# Patient Record
Sex: FEMALE | Race: BLACK OR AFRICAN AMERICAN | NOT HISPANIC OR LATINO
[De-identification: names, ages, dates, MRNs, and addresses within clinical notes are randomized per-mention and may not be internally consistent; named-entity substitution may affect disease eponyms.]

---

## 2017-01-03 ENCOUNTER — FORM ENCOUNTER (OUTPATIENT)
Age: 42
End: 2017-01-03

## 2017-01-04 ENCOUNTER — APPOINTMENT (OUTPATIENT)
Dept: MAMMOGRAPHY | Facility: IMAGING CENTER | Age: 42
End: 2017-01-04

## 2017-01-04 ENCOUNTER — OUTPATIENT (OUTPATIENT)
Dept: OUTPATIENT SERVICES | Facility: HOSPITAL | Age: 42
LOS: 1 days | End: 2017-01-04
Payer: COMMERCIAL

## 2017-01-04 DIAGNOSIS — Z00.8 ENCOUNTER FOR OTHER GENERAL EXAMINATION: ICD-10-CM

## 2017-01-04 PROCEDURE — 77063 BREAST TOMOSYNTHESIS BI: CPT

## 2017-01-04 PROCEDURE — 77067 SCR MAMMO BI INCL CAD: CPT

## 2017-04-24 ENCOUNTER — APPOINTMENT (OUTPATIENT)
Dept: INTERNAL MEDICINE | Facility: CLINIC | Age: 42
End: 2017-04-24

## 2017-06-09 ENCOUNTER — APPOINTMENT (OUTPATIENT)
Dept: INTERNAL MEDICINE | Facility: CLINIC | Age: 42
End: 2017-06-09

## 2017-06-09 VITALS — DIASTOLIC BLOOD PRESSURE: 90 MMHG | SYSTOLIC BLOOD PRESSURE: 140 MMHG

## 2017-06-09 VITALS
WEIGHT: 142 LBS | BODY MASS INDEX: 29.81 KG/M2 | DIASTOLIC BLOOD PRESSURE: 84 MMHG | SYSTOLIC BLOOD PRESSURE: 122 MMHG | HEIGHT: 58 IN

## 2018-02-27 ENCOUNTER — APPOINTMENT (OUTPATIENT)
Dept: OBGYN | Facility: CLINIC | Age: 43
End: 2018-02-27
Payer: COMMERCIAL

## 2018-02-27 VITALS
DIASTOLIC BLOOD PRESSURE: 91 MMHG | BODY MASS INDEX: 29.47 KG/M2 | WEIGHT: 140.38 LBS | HEIGHT: 58 IN | SYSTOLIC BLOOD PRESSURE: 136 MMHG

## 2018-02-27 LAB
BILIRUB UR QL STRIP: NORMAL
GLUCOSE UR-MCNC: NORMAL
HCG UR QL: 0.2 EU/DL
HGB UR QL STRIP.AUTO: NORMAL
KETONES UR-MCNC: NORMAL
LEUKOCYTE ESTERASE UR QL STRIP: NORMAL
NITRITE UR QL STRIP: NORMAL
PH UR STRIP: 5.5
PROT UR STRIP-MCNC: NORMAL
SP GR UR STRIP: 1.02

## 2018-02-27 PROCEDURE — 81002 URINALYSIS NONAUTO W/O SCOPE: CPT

## 2018-02-27 PROCEDURE — 99396 PREV VISIT EST AGE 40-64: CPT

## 2018-03-01 LAB — BACTERIA UR CULT: NORMAL

## 2018-03-11 ENCOUNTER — EMERGENCY (EMERGENCY)
Facility: HOSPITAL | Age: 43
LOS: 1 days | Discharge: HOME | End: 2018-03-11
Attending: EMERGENCY MEDICINE | Admitting: EMERGENCY MEDICINE
Payer: COMMERCIAL

## 2018-03-11 VITALS
HEART RATE: 86 BPM | OXYGEN SATURATION: 100 % | RESPIRATION RATE: 16 BRPM | TEMPERATURE: 99 F | DIASTOLIC BLOOD PRESSURE: 96 MMHG | SYSTOLIC BLOOD PRESSURE: 149 MMHG

## 2018-03-11 LAB
ALBUMIN SERPL ELPH-MCNC: 4.3 G/DL — SIGNIFICANT CHANGE UP (ref 3.3–5)
ALP SERPL-CCNC: 57 U/L — SIGNIFICANT CHANGE UP (ref 40–120)
ALT FLD-CCNC: 32 U/L — SIGNIFICANT CHANGE UP (ref 4–33)
APPEARANCE UR: CLEAR — SIGNIFICANT CHANGE UP
AST SERPL-CCNC: 24 U/L — SIGNIFICANT CHANGE UP (ref 4–32)
BACTERIA # UR AUTO: SIGNIFICANT CHANGE UP
BASE EXCESS BLDV CALC-SCNC: 4.4 MMOL/L — SIGNIFICANT CHANGE UP
BASOPHILS # BLD AUTO: 0.02 K/UL — SIGNIFICANT CHANGE UP (ref 0–0.2)
BASOPHILS NFR BLD AUTO: 0.3 % — SIGNIFICANT CHANGE UP (ref 0–2)
BILIRUB SERPL-MCNC: 0.2 MG/DL — SIGNIFICANT CHANGE UP (ref 0.2–1.2)
BILIRUB UR-MCNC: NEGATIVE — SIGNIFICANT CHANGE UP
BLOOD GAS VENOUS - CREATININE: 0.76 MG/DL — SIGNIFICANT CHANGE UP (ref 0.5–1.3)
BLOOD UR QL VISUAL: NEGATIVE — SIGNIFICANT CHANGE UP
BUN SERPL-MCNC: 13 MG/DL — SIGNIFICANT CHANGE UP (ref 7–23)
CALCIUM SERPL-MCNC: 8.5 MG/DL — SIGNIFICANT CHANGE UP (ref 8.4–10.5)
CHLORIDE BLDV-SCNC: 106 MMOL/L — SIGNIFICANT CHANGE UP (ref 96–108)
CHLORIDE SERPL-SCNC: 102 MMOL/L — SIGNIFICANT CHANGE UP (ref 98–107)
CO2 SERPL-SCNC: 25 MMOL/L — SIGNIFICANT CHANGE UP (ref 22–31)
COLOR SPEC: SIGNIFICANT CHANGE UP
CREAT SERPL-MCNC: 0.8 MG/DL — SIGNIFICANT CHANGE UP (ref 0.5–1.3)
EOSINOPHIL # BLD AUTO: 0.09 K/UL — SIGNIFICANT CHANGE UP (ref 0–0.5)
EOSINOPHIL NFR BLD AUTO: 1.4 % — SIGNIFICANT CHANGE UP (ref 0–6)
GAS PNL BLDV: 136 MMOL/L — SIGNIFICANT CHANGE UP (ref 136–146)
GLUCOSE BLDV-MCNC: 82 — SIGNIFICANT CHANGE UP (ref 70–99)
GLUCOSE SERPL-MCNC: 79 MG/DL — SIGNIFICANT CHANGE UP (ref 70–99)
GLUCOSE UR-MCNC: NEGATIVE — SIGNIFICANT CHANGE UP
HCG SERPL-ACNC: < 5 MIU/ML — SIGNIFICANT CHANGE UP
HCO3 BLDV-SCNC: 26 MMOL/L — SIGNIFICANT CHANGE UP (ref 20–27)
HCT VFR BLD CALC: 39.2 % — SIGNIFICANT CHANGE UP (ref 34.5–45)
HCT VFR BLDV CALC: 39.1 % — SIGNIFICANT CHANGE UP (ref 34.5–45)
HGB BLD-MCNC: 12.4 G/DL — SIGNIFICANT CHANGE UP (ref 11.5–15.5)
HGB BLDV-MCNC: 12.7 G/DL — SIGNIFICANT CHANGE UP (ref 11.5–15.5)
IMM GRANULOCYTES # BLD AUTO: 0.02 # — SIGNIFICANT CHANGE UP
IMM GRANULOCYTES NFR BLD AUTO: 0.3 % — SIGNIFICANT CHANGE UP (ref 0–1.5)
KETONES UR-MCNC: NEGATIVE — SIGNIFICANT CHANGE UP
LACTATE BLDV-MCNC: 0.5 MMOL/L — SIGNIFICANT CHANGE UP (ref 0.5–2)
LEUKOCYTE ESTERASE UR-ACNC: NEGATIVE — SIGNIFICANT CHANGE UP
LYMPHOCYTES # BLD AUTO: 2.41 K/UL — SIGNIFICANT CHANGE UP (ref 1–3.3)
LYMPHOCYTES # BLD AUTO: 37.8 % — SIGNIFICANT CHANGE UP (ref 13–44)
MCHC RBC-ENTMCNC: 27.2 PG — SIGNIFICANT CHANGE UP (ref 27–34)
MCHC RBC-ENTMCNC: 31.6 % — LOW (ref 32–36)
MCV RBC AUTO: 86 FL — SIGNIFICANT CHANGE UP (ref 80–100)
MONOCYTES # BLD AUTO: 0.75 K/UL — SIGNIFICANT CHANGE UP (ref 0–0.9)
MONOCYTES NFR BLD AUTO: 11.8 % — SIGNIFICANT CHANGE UP (ref 2–14)
NEUTROPHILS # BLD AUTO: 3.09 K/UL — SIGNIFICANT CHANGE UP (ref 1.8–7.4)
NEUTROPHILS NFR BLD AUTO: 48.4 % — SIGNIFICANT CHANGE UP (ref 43–77)
NITRITE UR-MCNC: NEGATIVE — SIGNIFICANT CHANGE UP
NRBC # FLD: 0 — SIGNIFICANT CHANGE UP
PCO2 BLDV: 54 MMHG — HIGH (ref 41–51)
PH BLDV: 7.36 PH — SIGNIFICANT CHANGE UP (ref 7.32–7.43)
PH UR: 6.5 — SIGNIFICANT CHANGE UP (ref 4.6–8)
PLATELET # BLD AUTO: 354 K/UL — SIGNIFICANT CHANGE UP (ref 150–400)
PMV BLD: 9.5 FL — SIGNIFICANT CHANGE UP (ref 7–13)
PO2 BLDV: 24 MMHG — LOW (ref 35–40)
POTASSIUM BLDV-SCNC: 3.9 MMOL/L — SIGNIFICANT CHANGE UP (ref 3.4–4.5)
POTASSIUM SERPL-MCNC: 4.1 MMOL/L — SIGNIFICANT CHANGE UP (ref 3.5–5.3)
POTASSIUM SERPL-SCNC: 4.1 MMOL/L — SIGNIFICANT CHANGE UP (ref 3.5–5.3)
PROT SERPL-MCNC: 7.6 G/DL — SIGNIFICANT CHANGE UP (ref 6–8.3)
PROT UR-MCNC: 10 MG/DL — SIGNIFICANT CHANGE UP
RBC # BLD: 4.56 M/UL — SIGNIFICANT CHANGE UP (ref 3.8–5.2)
RBC # FLD: 13.5 % — SIGNIFICANT CHANGE UP (ref 10.3–14.5)
RBC CASTS # UR COMP ASSIST: SIGNIFICANT CHANGE UP
SAO2 % BLDV: 23.4 % — LOW (ref 60–85)
SODIUM SERPL-SCNC: 141 MMOL/L — SIGNIFICANT CHANGE UP (ref 135–145)
SP GR SPEC: 1.02 — SIGNIFICANT CHANGE UP (ref 1–1.04)
SQUAMOUS # UR AUTO: SIGNIFICANT CHANGE UP
UROBILINOGEN FLD QL: NORMAL MG/DL — SIGNIFICANT CHANGE UP
WBC # BLD: 6.38 K/UL — SIGNIFICANT CHANGE UP (ref 3.8–10.5)
WBC # FLD AUTO: 6.38 K/UL — SIGNIFICANT CHANGE UP (ref 3.8–10.5)

## 2018-03-11 PROCEDURE — 99284 EMERGENCY DEPT VISIT MOD MDM: CPT

## 2018-03-11 PROCEDURE — 74176 CT ABD & PELVIS W/O CONTRAST: CPT | Mod: 26

## 2018-03-11 RX ORDER — ACETAMINOPHEN 500 MG
1000 TABLET ORAL ONCE
Qty: 0 | Refills: 0 | Status: COMPLETED | OUTPATIENT
Start: 2018-03-11 | End: 2018-03-11

## 2018-03-11 RX ADMIN — Medication 400 MILLIGRAM(S): at 20:43

## 2018-03-11 NOTE — ED PROVIDER NOTE - PROGRESS NOTE DETAILS
Guevara PGY1: Pelvic exam performed- Jasmin Molina, ED tech chaperoned- no adnexal or CMT tenderness on bimanual exam Raven att: CT neg appy, ua neg pyelo. Labs wnl. Patient pain free. D/w patient ct indicates lt adnexal mass and recommends TVUS to further evaluate. Patient reports she can arrange an appointment with her OBGYN later this week.

## 2018-03-11 NOTE — ED PROVIDER NOTE - OBJECTIVE STATEMENT
42F hx of partial hysterectomy due to fibroids c/o of RLQ abdominal pain described as 8/10 dull sensation, worsened with sitting forward, with right flank radiation, with worsening on movement, constant pain x 3-7 days, with associated nausea x 1 but no emesis yesterday. Normal BMs, no diarrhea. constipation. No fevers/ chills/ dysuria/ hematuria. Had recent GYN appt 1 week ago with normal pelvic exam. Not currently sexually active, not endorsing vaginal bleed/discharge.

## 2018-03-11 NOTE — ED PROVIDER NOTE - ATTENDING CONTRIBUTION TO CARE
thelma: noted mild intermittent pain to the rt mid/upper adbomen approx one week ago. No other sx.   4 days ago, pain increased, became more constant and radiated to back. No other associated GI or  sx. Appetite is good. No prior hx same sx.  PMH includes partial hysterectomy for fibroids; ovaries not removed.   exam: remarkable for rt CVA tenderness (pain even on touching area, as well as palpation).  abd soft and nontender. Liver and spleen not palpated.   When asked when she feels the pain, pt points to area above [place] and just below ribs.   Etio sx??. Need to consider an atypical presentation of GB or renal colic.   Recc:  CT w/o contrast to first start evaluation for renal colic. CBB, CMP, ua, fluids. thelma: noted mild intermittent pain to the rt mid/upper adbomen approx one week ago. No other sx.   4 days ago, pain increased, became more constant and radiated to back. No other associated GI or  sx. Appetite is good. No prior hx same sx.  PMH includes partial hysterectomy for fibroids; ovaries not removed.   exam: remarkable for rt CVA tenderness (pain even on touching area, as well as palpation).  abd soft and nontender. Liver and spleen not palpated.   When asked when she feels the pain, pt points to area above [place] and just below ribs.   Etio sx??. Need to consider an atypical presentation of GB or renal colic.   Recc:  CT w/o contrast to first start evaluation for renal colic. CBB, CMP, ua, fluids..

## 2018-03-11 NOTE — ED PROVIDER NOTE - MEDICAL DECISION MAKING DETAILS
42F with hx partial hysterectomy with RLQ dull sensation- possible UTI vs cystitis vs pyelo vs renal stone, vs constipation, less likely colitis or appendicitis, will conduct bimanual to assess for adnexal tenderness and possible ovarian cause of pain, CT a/p if prelim labs nondiagnostic

## 2018-03-11 NOTE — ED ADULT TRIAGE NOTE - CHIEF COMPLAINT QUOTE
rlq pains,nausea yesterday,today. denies problems urinating. denies vomiting.  denies vag bleeding  lmp 2013- partial hysterectomy,fibroids

## 2018-03-11 NOTE — ED PROVIDER NOTE - SHIFT CHANGE DETAILS
Raven att: 42F h/o partial hysterectomy p/w 7 days intermitt rt mid abd pain, occ rad to back. Denies f, c, n, v, anorexia. Pos rt cva tenderness DDX pyelo, urolith PLAN labs, urine, ct [place], reassess.

## 2018-03-11 NOTE — ED ADULT NURSE NOTE - OBJECTIVE STATEMENT
Pt received in spot 4. Alert and oriented x3, ambulatory. Co rlq pain since yesterday. Denies n/v/d, dizziness, fevers, chills. Labs sent. IV placed. Refusing pain meds at this time. Will continue to monitor.

## 2018-03-13 LAB — SPECIMEN SOURCE: SIGNIFICANT CHANGE UP

## 2018-03-14 ENCOUNTER — OTHER (OUTPATIENT)
Age: 43
End: 2018-03-14

## 2018-03-14 ENCOUNTER — FORM ENCOUNTER (OUTPATIENT)
Age: 43
End: 2018-03-14

## 2018-03-14 LAB
-  AMIKACIN: SIGNIFICANT CHANGE UP
-  AMPICILLIN/SULBACTAM: SIGNIFICANT CHANGE UP
-  AMPICILLIN: SIGNIFICANT CHANGE UP
-  AZTREONAM: SIGNIFICANT CHANGE UP
-  CEFAZOLIN: SIGNIFICANT CHANGE UP
-  CEFEPIME: SIGNIFICANT CHANGE UP
-  CEFOXITIN: SIGNIFICANT CHANGE UP
-  CEFTAZIDIME: SIGNIFICANT CHANGE UP
-  CEFTRIAXONE: SIGNIFICANT CHANGE UP
-  CIPROFLOXACIN: SIGNIFICANT CHANGE UP
-  ERTAPENEM: SIGNIFICANT CHANGE UP
-  GENTAMICIN: SIGNIFICANT CHANGE UP
-  IMIPENEM: SIGNIFICANT CHANGE UP
-  LEVOFLOXACIN: SIGNIFICANT CHANGE UP
-  MEROPENEM: SIGNIFICANT CHANGE UP
-  NITROFURANTOIN: SIGNIFICANT CHANGE UP
-  PIPERACILLIN/TAZOBACTAM: SIGNIFICANT CHANGE UP
-  TIGECYCLINE: SIGNIFICANT CHANGE UP
-  TOBRAMYCIN: SIGNIFICANT CHANGE UP
-  TRIMETHOPRIM/SULFAMETHOXAZOLE: SIGNIFICANT CHANGE UP
BACTERIA UR CULT: SIGNIFICANT CHANGE UP
METHOD TYPE: SIGNIFICANT CHANGE UP
ORGANISM # SPEC MICROSCOPIC CNT: SIGNIFICANT CHANGE UP
ORGANISM # SPEC MICROSCOPIC CNT: SIGNIFICANT CHANGE UP

## 2018-03-14 RX ORDER — CEPHALEXIN 500 MG
1 CAPSULE ORAL
Qty: 14 | Refills: 0 | OUTPATIENT
Start: 2018-03-14 | End: 2018-03-20

## 2018-03-14 NOTE — ED POST DISCHARGE NOTE - RESULT SUMMARY
UCX: Gram negative elisa > 100,000 prelim. Admin Team to follow results to final. No antibiotic listed in ED provider note or prescription writer at time of discharge. Patient contact # [phone] no alt # listed Msg left with Admin # and hrs.

## 2018-03-15 ENCOUNTER — OUTPATIENT (OUTPATIENT)
Dept: OUTPATIENT SERVICES | Facility: HOSPITAL | Age: 43
LOS: 1 days | End: 2018-03-15
Payer: COMMERCIAL

## 2018-03-15 ENCOUNTER — APPOINTMENT (OUTPATIENT)
Dept: ULTRASOUND IMAGING | Facility: CLINIC | Age: 43
End: 2018-03-15
Payer: COMMERCIAL

## 2018-03-15 DIAGNOSIS — N83.9 NONINFLAMMATORY DISORDER OF OVARY, FALLOPIAN TUBE AND BROAD LIGAMENT, UNSPECIFIED: ICD-10-CM

## 2018-03-15 PROCEDURE — 76856 US EXAM PELVIC COMPLETE: CPT

## 2018-03-15 PROCEDURE — 76830 TRANSVAGINAL US NON-OB: CPT

## 2018-03-15 PROCEDURE — 76856 US EXAM PELVIC COMPLETE: CPT | Mod: 26

## 2018-03-15 PROCEDURE — 76830 TRANSVAGINAL US NON-OB: CPT | Mod: 26

## 2018-08-09 ENCOUNTER — MESSAGE (OUTPATIENT)
Age: 43
End: 2018-08-09

## 2018-12-13 ENCOUNTER — APPOINTMENT (OUTPATIENT)
Dept: INTERNAL MEDICINE | Facility: CLINIC | Age: 43
End: 2018-12-13

## 2019-01-26 RX ORDER — NEOMYCIN SULFATE, POLYMYXIN B SULFATE AND HYDROCORTISONE 3.5; 10000; 1 MG/ML; [IU]/ML; MG/ML
3.5-10000-1 SOLUTION AURICULAR (OTIC)
Qty: 1 | Refills: 0 | Status: COMPLETED | COMMUNITY
Start: 2019-01-26 | End: 2019-01-31

## 2019-02-06 ENCOUNTER — APPOINTMENT (OUTPATIENT)
Dept: INTERNAL MEDICINE | Facility: CLINIC | Age: 44
End: 2019-02-06

## 2019-02-15 ENCOUNTER — APPOINTMENT (OUTPATIENT)
Dept: INTERNAL MEDICINE | Facility: CLINIC | Age: 44
End: 2019-02-15

## 2019-02-26 ENCOUNTER — APPOINTMENT (OUTPATIENT)
Dept: INTERNAL MEDICINE | Facility: CLINIC | Age: 44
End: 2019-02-26
Payer: COMMERCIAL

## 2019-02-26 VITALS
BODY MASS INDEX: 28.97 KG/M2 | WEIGHT: 138 LBS | OXYGEN SATURATION: 98 % | HEART RATE: 79 BPM | DIASTOLIC BLOOD PRESSURE: 90 MMHG | HEIGHT: 58 IN | SYSTOLIC BLOOD PRESSURE: 132 MMHG

## 2019-02-26 DIAGNOSIS — L72.3 SEBACEOUS CYST: ICD-10-CM

## 2019-02-26 DIAGNOSIS — H66.90 OTITIS MEDIA, UNSPECIFIED, UNSPECIFIED EAR: ICD-10-CM

## 2019-02-26 DIAGNOSIS — H60.90 UNSPECIFIED OTITIS EXTERNA, UNSPECIFIED EAR: ICD-10-CM

## 2019-02-26 PROCEDURE — 99214 OFFICE O/P EST MOD 30 MIN: CPT

## 2019-02-26 NOTE — PHYSICAL EXAM
[No Acute Distress] : no acute distress [Well Nourished] : well nourished [Well Developed] : well developed [Well-Appearing] : well-appearing [Normal Outer Ear/Nose] : the outer ears and nose were normal in appearance [Normal Oropharynx] : the oropharynx was normal [Supple] : supple [No Lymphadenopathy] : no lymphadenopathy [No Respiratory Distress] : no respiratory distress  [Clear to Auscultation] : lungs were clear to auscultation bilaterally [Normal Rate] : normal rate  [Regular Rhythm] : with a regular rhythm [Normal S1, S2] : normal S1 and S2 [Soft] : abdomen soft [Non Tender] : non-tender [de-identified] : 5-6 cm irregular, firm cyst on right scalp [de-identified] : right TM dull and retracted without erythema

## 2019-02-26 NOTE — HISTORY OF PRESENT ILLNESS
[FreeTextEntry8] : Pt returns to office to f/u on right ear discharge.  Was treated by on-call doc with ear drops for a discharge at the end of January.  She wound up going to urgent care and was put on amoxicillin for 10 days and a spray to help with the congestion.  She finished the course of antibiotics and her symptoms are much better.  No c/o pain at this time, more a fullness.  \par \par No discharge from her ear at this time.  Had a fever to 101-102.   Was told that she should see an ENT.\par \par Also c/o a lump on her scalp.

## 2019-02-26 NOTE — ASSESSMENT
[FreeTextEntry1] : 1.  Otitis media/externa - resolved although TM is dull and retracted on the right.  ENT referral given\par 2.  Scalp lesion c/w sebaceous cyst.  Plastics referral given\par 3.  Work paperwork completed for today's visit and to excuse her from work tonight given that she is here today and still has not slept.\par 4.  RTO for a CPE.

## 2019-03-14 ENCOUNTER — APPOINTMENT (OUTPATIENT)
Dept: INTERNAL MEDICINE | Facility: CLINIC | Age: 44
End: 2019-03-14
Payer: COMMERCIAL

## 2019-03-14 VITALS
DIASTOLIC BLOOD PRESSURE: 70 MMHG | BODY MASS INDEX: 27.59 KG/M2 | WEIGHT: 132 LBS | HEART RATE: 77 BPM | SYSTOLIC BLOOD PRESSURE: 114 MMHG | OXYGEN SATURATION: 98 %

## 2019-03-14 DIAGNOSIS — H69.82 OTHER SPECIFIED DISORDERS OF EUSTACHIAN TUBE, LEFT EAR: ICD-10-CM

## 2019-03-14 PROCEDURE — 99213 OFFICE O/P EST LOW 20 MIN: CPT

## 2019-03-14 NOTE — HISTORY OF PRESENT ILLNESS
[Earache (L)] : pain in left ear [___ Weeks ago] :  [unfilled] weeks ago [Constant] : constant [FreeTextEntry8] : pt continues to have left ear pain. last night she only got 1 hour of sleep and had to call out from work as an [employer]. She asks for work excuse letter today.

## 2019-03-14 NOTE — PHYSICAL EXAM
[No Acute Distress] : no acute distress [Normal Sclera/Conjunctiva] : normal sclera/conjunctiva [Normal Oropharynx] : the oropharynx was normal [Normal TMs] : both tympanic membranes were normal [Supple] : supple

## 2019-03-25 ENCOUNTER — APPOINTMENT (OUTPATIENT)
Dept: PLASTIC SURGERY | Facility: CLINIC | Age: 44
End: 2019-03-25
Payer: COMMERCIAL

## 2019-03-25 VITALS — TEMPERATURE: 98.3 F | BODY MASS INDEX: 30.29 KG/M2 | HEIGHT: 67 IN | WEIGHT: 193 LBS

## 2019-03-25 VITALS — BODY MASS INDEX: 27.42 KG/M2 | HEIGHT: 59 IN | TEMPERATURE: 97.7 F | WEIGHT: 136 LBS

## 2019-03-25 PROCEDURE — 99203 OFFICE O/P NEW LOW 30 MIN: CPT

## 2019-03-25 NOTE — PHYSICAL EXAM
[de-identified] : there is a firm scalp neoplasm (approx 1.5 to 2 cm) of the right parietal scalp; there is alopecia at the apex of the neoplasm; there is no drainage w/ palpation; there is no adjacent scars, erythema, or open wounds

## 2019-03-25 NOTE — HISTORY OF PRESENT ILLNESS
[FreeTextEntry1] : 45 yo F presents with right scalp mass\par present for 2 years\par increasing in size\par intermittent drainage\par no interventions\par no hx of similar lesions elsewhere\par no imaging\par patient states that increase in size causes discomfort

## 2019-03-25 NOTE — PHYSICAL EXAM
[de-identified] : there is a firm scalp neoplasm (approx 1.5 to 2 cm) of the right parietal scalp; there is alopecia at the apex of the neoplasm; there is no drainage w/ palpation; there is no adjacent scars, erythema, or open wounds

## 2019-04-08 ENCOUNTER — MESSAGE (OUTPATIENT)
Age: 44
End: 2019-04-08

## 2019-04-11 ENCOUNTER — APPOINTMENT (OUTPATIENT)
Dept: INTERNAL MEDICINE | Facility: CLINIC | Age: 44
End: 2019-04-11

## 2019-04-17 ENCOUNTER — OUTPATIENT (OUTPATIENT)
Dept: OUTPATIENT SERVICES | Facility: HOSPITAL | Age: 44
LOS: 1 days | End: 2019-04-17
Payer: COMMERCIAL

## 2019-04-17 VITALS
HEIGHT: 59 IN | TEMPERATURE: 98 F | DIASTOLIC BLOOD PRESSURE: 70 MMHG | WEIGHT: 134.04 LBS | SYSTOLIC BLOOD PRESSURE: 158 MMHG | RESPIRATION RATE: 18 BRPM | HEART RATE: 65 BPM | OXYGEN SATURATION: 100 %

## 2019-04-17 DIAGNOSIS — Z90.710 ACQUIRED ABSENCE OF BOTH CERVIX AND UTERUS: Chronic | ICD-10-CM

## 2019-04-17 DIAGNOSIS — S91.203A UNSPECIFIED OPEN WOUND OF UNSPECIFIED GREAT TOE WITH DAMAGE TO NAIL, INITIAL ENCOUNTER: Chronic | ICD-10-CM

## 2019-04-17 DIAGNOSIS — R22.0 LOCALIZED SWELLING, MASS AND LUMP, HEAD: ICD-10-CM

## 2019-04-17 DIAGNOSIS — Z98.890 OTHER SPECIFIED POSTPROCEDURAL STATES: ICD-10-CM

## 2019-04-17 DIAGNOSIS — D48.1 NEOPLASM OF UNCERTAIN BEHAVIOR OF CONNECTIVE AND OTHER SOFT TISSUE: ICD-10-CM

## 2019-04-17 DIAGNOSIS — M95.2 OTHER ACQUIRED DEFORMITY OF HEAD: ICD-10-CM

## 2019-04-17 DIAGNOSIS — D21.0 BENIGN NEOPLASM OF CONNECTIVE AND OTHER SOFT TISSUE OF HEAD, FACE AND NECK: ICD-10-CM

## 2019-04-17 DIAGNOSIS — Z01.818 ENCOUNTER FOR OTHER PREPROCEDURAL EXAMINATION: ICD-10-CM

## 2019-04-17 DIAGNOSIS — D21.9 BENIGN NEOPLASM OF CONNECTIVE AND OTHER SOFT TISSUE, UNSPECIFIED: ICD-10-CM

## 2019-04-17 DIAGNOSIS — Z98.891 HISTORY OF UTERINE SCAR FROM PREVIOUS SURGERY: Chronic | ICD-10-CM

## 2019-04-17 LAB
HCT VFR BLD CALC: 39.3 % — SIGNIFICANT CHANGE UP (ref 34.5–45)
HGB BLD-MCNC: 12.1 G/DL — SIGNIFICANT CHANGE UP (ref 11.5–15.5)
MCHC RBC-ENTMCNC: 27.3 PG — SIGNIFICANT CHANGE UP (ref 27–34)
MCHC RBC-ENTMCNC: 30.8 GM/DL — LOW (ref 32–36)
MCV RBC AUTO: 88.5 FL — SIGNIFICANT CHANGE UP (ref 80–100)
PLATELET # BLD AUTO: 368 K/UL — SIGNIFICANT CHANGE UP (ref 150–400)
RBC # BLD: 4.44 M/UL — SIGNIFICANT CHANGE UP (ref 3.8–5.2)
RBC # FLD: 13.1 % — SIGNIFICANT CHANGE UP (ref 10.3–14.5)
WBC # BLD: 4.57 K/UL — SIGNIFICANT CHANGE UP (ref 3.8–10.5)
WBC # FLD AUTO: 4.57 K/UL — SIGNIFICANT CHANGE UP (ref 3.8–10.5)

## 2019-04-17 PROCEDURE — G0463: CPT

## 2019-04-17 PROCEDURE — 85027 COMPLETE CBC AUTOMATED: CPT

## 2019-04-17 RX ORDER — LIDOCAINE HCL 20 MG/ML
0.2 VIAL (ML) INJECTION ONCE
Qty: 0 | Refills: 0 | Status: DISCONTINUED | OUTPATIENT
Start: 2019-04-24 | End: 2019-05-09

## 2019-04-17 RX ORDER — SODIUM CHLORIDE 9 MG/ML
3 INJECTION INTRAMUSCULAR; INTRAVENOUS; SUBCUTANEOUS EVERY 8 HOURS
Qty: 0 | Refills: 0 | Status: DISCONTINUED | OUTPATIENT
Start: 2019-04-24 | End: 2019-05-09

## 2019-04-17 NOTE — H&P PST ADULT - NSICDXPASTMEDICALHX_GEN_ALL_CORE_FT
PAST MEDICAL HISTORY:  Fibroid uterus     HTN (hypertension)     Migraine     Soft tissue tumor, benign right side of scalp PAST MEDICAL HISTORY:  Environmental allergies     Fibroid uterus h/o ANGELITO 2013    HTN (hypertension) last treated 2013    Migraine     Soft tissue tumor, benign right side of scalp

## 2019-04-17 NOTE — H&P PST ADULT - HISTORY OF PRESENT ILLNESS
This is a 43 year old female with a mass on right scalp x 2 years.  Pt states it has become larger over the past 18 months and causing discomfort.  Now scheduled for excision of right scalp neoplasm on 4-24-19

## 2019-04-17 NOTE — H&P PST ADULT - NSICDXPASTSURGICALHX_GEN_ALL_CORE_FT
PAST SURGICAL HISTORY:  H/O  section     S/P ANGELITO (total abdominal hysterectomy) fibroids     S/P tooth extraction ' 2009    [place] teeth X2 PAST SURGICAL HISTORY:  H/O  section     S/P ANGELITO (total abdominal hysterectomy) fibroids     S/P tooth extraction ' 2009    [place] teeth X2    Traumatic loss of toenail of great toe excision  right side 2018

## 2019-04-17 NOTE — H&P PST ADULT - NSICDXPROBLEM_GEN_ALL_CORE_FT
PROBLEM DIAGNOSES  Problem: Soft tissue tumor, benign  Assessment and Plan: excision of right scalp neoplasm    Problem: H/O excision of testicular mass  Assessment and Plan:

## 2019-04-17 NOTE — H&P PST ADULT - NSANTHOSAYNRD_GEN_A_CORE
No. ANGÉLICA screening performed.  STOP BANG Legend: 0-2 = LOW Risk; 3-4 = INTERMEDIATE Risk; 5-8 = HIGH Risk

## 2019-04-23 RX ORDER — CELECOXIB 200 MG/1
200 CAPSULE ORAL ONCE
Qty: 0 | Refills: 0 | Status: DISCONTINUED | OUTPATIENT
Start: 2019-04-24 | End: 2019-05-09

## 2019-04-23 RX ORDER — SODIUM CHLORIDE 9 MG/ML
1000 INJECTION, SOLUTION INTRAVENOUS
Qty: 0 | Refills: 0 | Status: DISCONTINUED | OUTPATIENT
Start: 2019-04-24 | End: 2019-05-09

## 2019-04-23 RX ORDER — ONDANSETRON 8 MG/1
4 TABLET, FILM COATED ORAL ONCE
Qty: 0 | Refills: 0 | Status: DISCONTINUED | OUTPATIENT
Start: 2019-04-24 | End: 2019-05-09

## 2019-04-24 ENCOUNTER — OUTPATIENT (OUTPATIENT)
Dept: OUTPATIENT SERVICES | Facility: HOSPITAL | Age: 44
LOS: 1 days | End: 2019-04-24
Payer: COMMERCIAL

## 2019-04-24 ENCOUNTER — RESULT REVIEW (OUTPATIENT)
Age: 44
End: 2019-04-24

## 2019-04-24 VITALS
OXYGEN SATURATION: 100 % | DIASTOLIC BLOOD PRESSURE: 68 MMHG | RESPIRATION RATE: 18 BRPM | SYSTOLIC BLOOD PRESSURE: 144 MMHG | HEART RATE: 80 BPM

## 2019-04-24 VITALS
WEIGHT: 134.04 LBS | TEMPERATURE: 98 F | OXYGEN SATURATION: 100 % | HEIGHT: 59 IN | HEART RATE: 65 BPM | SYSTOLIC BLOOD PRESSURE: 158 MMHG | RESPIRATION RATE: 18 BRPM | DIASTOLIC BLOOD PRESSURE: 83 MMHG

## 2019-04-24 DIAGNOSIS — Z90.710 ACQUIRED ABSENCE OF BOTH CERVIX AND UTERUS: Chronic | ICD-10-CM

## 2019-04-24 DIAGNOSIS — R22.0 LOCALIZED SWELLING, MASS AND LUMP, HEAD: ICD-10-CM

## 2019-04-24 DIAGNOSIS — D21.0 BENIGN NEOPLASM OF CONNECTIVE AND OTHER SOFT TISSUE OF HEAD, FACE AND NECK: ICD-10-CM

## 2019-04-24 DIAGNOSIS — S91.203A UNSPECIFIED OPEN WOUND OF UNSPECIFIED GREAT TOE WITH DAMAGE TO NAIL, INITIAL ENCOUNTER: Chronic | ICD-10-CM

## 2019-04-24 DIAGNOSIS — Z01.818 ENCOUNTER FOR OTHER PREPROCEDURAL EXAMINATION: ICD-10-CM

## 2019-04-24 DIAGNOSIS — Z98.891 HISTORY OF UTERINE SCAR FROM PREVIOUS SURGERY: Chronic | ICD-10-CM

## 2019-04-24 DIAGNOSIS — D48.1 NEOPLASM OF UNCERTAIN BEHAVIOR OF CONNECTIVE AND OTHER SOFT TISSUE: ICD-10-CM

## 2019-04-24 DIAGNOSIS — M95.2 OTHER ACQUIRED DEFORMITY OF HEAD: ICD-10-CM

## 2019-04-24 PROCEDURE — 21014 EXC FACE TUM DEEP 2 CM/>: CPT

## 2019-04-24 PROCEDURE — 88305 TISSUE EXAM BY PATHOLOGIST: CPT | Mod: 26

## 2019-04-24 PROCEDURE — 14020 TIS TRNFR S/A/L 10 SQ CM/<: CPT

## 2019-04-24 PROCEDURE — 88305 TISSUE EXAM BY PATHOLOGIST: CPT

## 2019-04-24 PROCEDURE — C1889: CPT

## 2019-04-24 NOTE — ASU PATIENT PROFILE, ADULT - PSH
H/O  section    S/P ANGELITO (total abdominal hysterectomy)  fibroids   S/P tooth extraction  '     [place] teeth X2  Traumatic loss of toenail of great toe  excision  right side 2018

## 2019-04-24 NOTE — ASU DISCHARGE PLAN (ADULT/PEDIATRIC) - ASU DC SPECIAL INSTRUCTIONSFT
Please follow up with Dr. Garcia within x1 week after discharge from the hospital. You may call [phone] to schedule an appointment.    May shower in 48 hours. Do not scrub or rub incisions. Do not submerge incision.

## 2019-04-24 NOTE — BRIEF OPERATIVE NOTE - NSICDXBRIEFPROCEDURE_GEN_ALL_CORE_FT
PROCEDURES:  Excision, subcutaneous neoplasm, face or scalp, 2 cm or greater in diameter 24-Apr-2019 11:59:38  Mao Castro

## 2019-04-24 NOTE — ASU PATIENT PROFILE, ADULT - PMH
Environmental allergies    Fibroid uterus  h/o ANGELITO 2013  HTN (hypertension)  last treated 2013  Migraine    Soft tissue tumor, benign  right side of scalp

## 2019-04-24 NOTE — ASU DISCHARGE PLAN (ADULT/PEDIATRIC) - CARE PROVIDER_API CALL
Mi Garcia (DDS)  Pediatric Dental Medicine  [address] [facility] [address], [address]  [place] [address]  Phone: [phone]  Fax: [phone]  Follow Up Time:

## 2019-04-24 NOTE — ASU DISCHARGE PLAN (ADULT/PEDIATRIC) - CALL YOUR DOCTOR IF YOU HAVE ANY OF THE FOLLOWING:
Bleeding that does not stop/Pain not relieved by Medications/Numbness, tingling, color or temperature change to extremity/Wound/Surgical Site with redness, or foul smelling discharge or pus/Fever greater than (need to indicate Fahrenheit or Celsius)/Swelling that gets worse

## 2019-04-25 PROBLEM — D21.9 BENIGN NEOPLASM OF CONNECTIVE AND OTHER SOFT TISSUE, UNSPECIFIED: Chronic | Status: ACTIVE | Noted: 2019-04-17

## 2019-04-25 PROBLEM — Z91.09 OTHER ALLERGY STATUS, OTHER THAN TO DRUGS AND BIOLOGICAL SUBSTANCES: Chronic | Status: ACTIVE | Noted: 2019-04-17

## 2019-04-29 ENCOUNTER — APPOINTMENT (OUTPATIENT)
Dept: PLASTIC SURGERY | Facility: CLINIC | Age: 44
End: 2019-04-29
Payer: COMMERCIAL

## 2019-04-29 PROCEDURE — 99024 POSTOP FOLLOW-UP VISIT: CPT

## 2019-04-30 NOTE — REASON FOR VISIT
[Post Op: _________] : a [unfilled] post op visit [FreeTextEntry1] : DOS: 04/24/2019 s/p excision of right scalp neoplasm.

## 2019-05-02 ENCOUNTER — APPOINTMENT (OUTPATIENT)
Dept: PLASTIC SURGERY | Facility: CLINIC | Age: 44
End: 2019-05-02

## 2019-05-06 ENCOUNTER — APPOINTMENT (OUTPATIENT)
Dept: PLASTIC SURGERY | Facility: CLINIC | Age: 44
End: 2019-05-06
Payer: COMMERCIAL

## 2019-05-06 DIAGNOSIS — D49.2 NEOPLASM OF UNSPECIFIED BEHAVIOR OF BONE, SOFT TISSUE, AND SKIN: ICD-10-CM

## 2019-05-06 PROCEDURE — 99024 POSTOP FOLLOW-UP VISIT: CPT

## 2019-05-07 NOTE — REASON FOR VISIT
[FreeTextEntry1] : DOS: 04/24/2019 s/p excision of right scalp neoplasm.  pt is doing well, no complaints.\par

## 2019-05-10 LAB — SURGICAL PATHOLOGY STUDY: SIGNIFICANT CHANGE UP

## 2019-05-21 NOTE — ASU DISCHARGE PLAN (ADULT/PEDIATRIC) - PLEASE INDICATE TEMPERATURE IN FAHRENHEIT OR CELSIUS
ANTICOAGULATION FOLLOW-UP CLINIC VISIT    Patient Name:  Michael Martinez  Date:  5/21/2019  Contact Type:  Face to Face    SUBJECTIVE:  Patient Findings         Clinical Outcomes     Negatives:   Major bleeding event, Thromboembolic event, Anticoagulation-related hospital admission, Anticoagulation-related ED visit, Anticoagulation-related fatality           OBJECTIVE    INR Protime   Date Value Ref Range Status   05/21/2019 3.0 (A) 0.86 - 1.14 Final       ASSESSMENT / PLAN  INR assessment THER    Recheck INR In: 4 WEEKS    INR Location Clinic      Anticoagulation Summary  As of 5/21/2019    INR goal:   2.0-3.0   TTR:   93.1 % (2.5 y)   INR used for dosing:   3.0 (5/21/2019)   Warfarin maintenance plan:   5 mg (5 mg x 1) every Mon, Fri; 7.5 mg (7.5 mg x 1) all other days   Full warfarin instructions:   5 mg every Mon, Fri; 7.5 mg all other days   Weekly warfarin total:   47.5 mg   Plan last modified:   Naya Diallo RN (5/21/2019)   Next INR check:   6/18/2019   Priority:   INR   Target end date:   Indefinite    Indications    Long-term (current) use of anticoagulants [Z79.01] [Z79.01]  Anticoagulation monitoring  INR range 2-3 [Z79.01]  History of pulmonary embolism [Z86.711]  Personal history of DVT (deep vein thrombosis) [Z86.718]             Anticoagulation Episode Summary     INR check location:   Anticoagulation Clinic    Preferred lab:   [facility]    Send INR reminders to:    INR    Comments:         Anticoagulation Care Providers     Provider Role Specialty Phone number    Isma Martinez MD [facility] [phone]            See the Encounter Report to view Anticoagulation Flowsheet and Dosing Calendar (Go to Encounters tab in chart review, and find the Anticoagulation Therapy Visit)        Naya Diallo, RN                  [phone]

## 2019-06-12 ENCOUNTER — APPOINTMENT (OUTPATIENT)
Dept: PLASTIC SURGERY | Facility: CLINIC | Age: 44
End: 2019-06-12

## 2019-06-14 ENCOUNTER — APPOINTMENT (OUTPATIENT)
Dept: INTERNAL MEDICINE | Facility: CLINIC | Age: 44
End: 2019-06-14

## 2019-06-25 ENCOUNTER — TRANSCRIPTION ENCOUNTER (OUTPATIENT)
Age: 44
End: 2019-06-25

## 2019-06-25 ENCOUNTER — APPOINTMENT (OUTPATIENT)
Dept: INTERNAL MEDICINE | Facility: CLINIC | Age: 44
End: 2019-06-25
Payer: COMMERCIAL

## 2019-06-25 VITALS
HEART RATE: 73 BPM | SYSTOLIC BLOOD PRESSURE: 135 MMHG | OXYGEN SATURATION: 97 % | BODY MASS INDEX: 21.5 KG/M2 | WEIGHT: 137 LBS | HEIGHT: 67 IN | DIASTOLIC BLOOD PRESSURE: 90 MMHG

## 2019-06-25 DIAGNOSIS — Z87.09 PERSONAL HISTORY OF OTHER DISEASES OF THE RESPIRATORY SYSTEM: ICD-10-CM

## 2019-06-25 PROCEDURE — 99214 OFFICE O/P EST MOD 30 MIN: CPT

## 2019-06-25 NOTE — HISTORY OF PRESENT ILLNESS
[FreeTextEntry8] : 43 y/o F here for acute visit.\par In usoh until 3 weeks ago. Experienced sore throat. No fevers, no chills.\par She used flonase, with partial relief of nasal congestion.\par Thought also secondary to allergies. \par A few days ago, sore throat recurred. Left tonsil area greater than right.\par Today still with throat pain. 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

## 2019-06-25 NOTE — ASSESSMENT
[FreeTextEntry1] : 45 y/o F here for throat pain.\par No criteria for strep throat except for absence of cough.\par Exam unremarkable.\par Pt would like to be evaluated by ENT. She states she was given a referral earlier this year but forgot the name of the person she was referred to\par Salt water gargles, cepacol lozenges. \par ENT referral.\par rto prn

## 2019-06-25 NOTE — PHYSICAL EXAM
[No Acute Distress] : no acute distress [Well Nourished] : well nourished [Well Developed] : well developed [Well-Appearing] : well-appearing [Normal Sclera/Conjunctiva] : normal sclera/conjunctiva [PERRL] : pupils equal round and reactive to light [EOMI] : extraocular movements intact [Normal Outer Ear/Nose] : the outer ears and nose were normal in appearance [Normal Oropharynx] : the oropharynx was normal [Normal TMs] : both tympanic membranes were normal [Normal Nasal Mucosa] : the nasal mucosa was normal [No JVD] : no jugular venous distention [Supple] : supple [No Lymphadenopathy] : no lymphadenopathy [No Respiratory Distress] : no respiratory distress  [Clear to Auscultation] : lungs were clear to auscultation bilaterally [No Accessory Muscle Use] : no accessory muscle use [Normal Rate] : normal rate  [Regular Rhythm] : with a regular rhythm [Normal S1, S2] : normal S1 and S2 [Soft] : abdomen soft [Non Tender] : non-tender [No HSM] : no HSM [No CVA Tenderness] : no CVA  tenderness

## 2019-07-17 ENCOUNTER — APPOINTMENT (OUTPATIENT)
Dept: OBGYN | Facility: CLINIC | Age: 44
End: 2019-07-17
Payer: COMMERCIAL

## 2019-07-17 VITALS
BODY MASS INDEX: 20.48 KG/M2 | HEIGHT: 67 IN | DIASTOLIC BLOOD PRESSURE: 80 MMHG | WEIGHT: 130.5 LBS | SYSTOLIC BLOOD PRESSURE: 130 MMHG

## 2019-07-17 DIAGNOSIS — N83.9 NONINFLAMMATORY DISORDER OF OVARY, FALLOPIAN TUBE AND BROAD LIGAMENT, UNSPECIFIED: ICD-10-CM

## 2019-07-17 PROCEDURE — 99396 PREV VISIT EST AGE 40-64: CPT

## 2019-07-17 NOTE — PHYSICAL EXAM
[Awake] : awake [Alert] : alert [Acute Distress] : no acute distress [LAD] : no lymphadenopathy [Thyroid Nodule] : no thyroid nodule [Goiter] : no goiter [Mass] : no breast mass [Nipple Discharge] : no nipple discharge [Axillary LAD] : no axillary lymphadenopathy [Soft] : soft [Tender] : non tender [Distended] : not distended [H/Smegaly] : no hepatosplenomegaly [Oriented x3] : oriented to person, place, and time [Depressed Mood] : not depressed [Flat Affect] : affect not flat [Normal] : cervix [No Bleeding] : there was no active vaginal bleeding [Absent] : absent [Uterine Adnexae] : were not tender and not enlarged

## 2019-07-19 LAB — HPV HIGH+LOW RISK DNA PNL CVX: NOT DETECTED

## 2019-07-22 LAB — CYTOLOGY CVX/VAG DOC THIN PREP: NORMAL

## 2019-07-25 ENCOUNTER — APPOINTMENT (OUTPATIENT)
Dept: OTOLARYNGOLOGY | Facility: CLINIC | Age: 44
End: 2019-07-25

## 2019-09-06 ENCOUNTER — APPOINTMENT (OUTPATIENT)
Dept: INTERNAL MEDICINE | Facility: CLINIC | Age: 44
End: 2019-09-06
Payer: COMMERCIAL

## 2019-09-06 DIAGNOSIS — M25.511 PAIN IN RIGHT SHOULDER: ICD-10-CM

## 2019-09-06 PROCEDURE — 99213 OFFICE O/P EST LOW 20 MIN: CPT

## 2019-09-06 NOTE — PHYSICAL EXAM
[No Acute Distress] : no acute distress [Well Developed] : well developed [Well Nourished] : well nourished [Grossly Normal Strength/Tone] : grossly normal strength/tone [Well-Appearing] : well-appearing [No Joint Swelling] : no joint swelling [de-identified] : Right shoulder with full range of motion, no crepitus or locking sensation appreciated.

## 2019-09-06 NOTE — ASSESSMENT
[FreeTextEntry1] : Patient here with complaints of right shoulder pain and bilateral hip pain after undergoing intensive training for employment purposes.  Will have her evaluated by sports medicine to rule out any internal derangement.

## 2019-09-06 NOTE — HISTORY OF PRESENT ILLNESS
[FreeTextEntry8] : 44-year-old female who comes in with complaints of right shoulder pain and bilateral hip pain.  Has been undergoing extensive training for the [employer] and now plans to become a .  Feels soreness deep in the shoulder socket but has good range of motion.  Before she moves forward with additional training she wants to know what is going on with her shoulder.

## 2019-10-08 ENCOUNTER — CLINICAL ADVICE (OUTPATIENT)
Age: 44
End: 2019-10-08

## 2019-10-11 ENCOUNTER — APPOINTMENT (OUTPATIENT)
Dept: INTERNAL MEDICINE | Facility: CLINIC | Age: 44
End: 2019-10-11
Payer: COMMERCIAL

## 2019-10-11 ENCOUNTER — CLINICAL ADVICE (OUTPATIENT)
Age: 44
End: 2019-10-11

## 2019-10-11 PROCEDURE — 99212 OFFICE O/P EST SF 10 MIN: CPT

## 2020-01-27 ENCOUNTER — APPOINTMENT (OUTPATIENT)
Dept: ORTHOPEDIC SURGERY | Facility: CLINIC | Age: 45
End: 2020-01-27
Payer: COMMERCIAL

## 2020-01-27 VITALS — WEIGHT: 130 LBS | BODY MASS INDEX: 26.21 KG/M2 | HEIGHT: 59 IN

## 2020-01-27 DIAGNOSIS — M25.851 OTHER SPECIFIED JOINT DISORDERS, RIGHT HIP: ICD-10-CM

## 2020-01-27 PROCEDURE — 72170 X-RAY EXAM OF PELVIS: CPT

## 2020-01-27 PROCEDURE — 99203 OFFICE O/P NEW LOW 30 MIN: CPT

## 2020-01-27 NOTE — DISCUSSION/SUMMARY
[de-identified] : 44 year old female presents with right femoral acetabular impingement.\par \par Patient presents with hip and groin pain consistent with femoral acetabular impingement. Discussion was had with the patient regarding this is a common cause of early hip dysfunction and secondary osteoarthritis. Typically refers to femoral versus acetabular based impingement of the femoral neck at the acetabular rim, which can limit hip motion, damaged cartilage/labrum, and advance osteoarthritis.\par \par Discussed short-term and long-term outcomes as well as the goal of treatment to reduce pain and restore function. Nonsurgical treatment is typically first-line therapy that may take weeks to months to resolve symptoms; includes rest, NSAIDs, home stretching and program versus physical therapy to restore normal strength/ROM/function of the hip. Further treatment may include intra-articular hip injection for therapeutic and diagnostic purposes. I also discussed the role of arthroscopic surgical intervention when nonsurgical treatment does not adequately relieve pain/inflammation.\par \par Recommendations: Conservative modalities as above (overhead activity rest/avoidance, ice, NSAIDs, strengthening and stretching program).\par \par Begin a trial of PT. Rx given. \par \par Followup: as needed if symptoms persist\par \par

## 2020-01-27 NOTE — PHYSICAL EXAM
[de-identified] : Right Hip Exam:\par \par Skin: Clean/dry and intact\par Inspection: No obvious deformity, no swelling.\par Pulses: 2+ DP/PT pulses\par ROM: 110 flexion. Internal rotation to 20. External rotation to 40.\par Painful ROM: None, + groin pain. \par Tenderness: No tenderness over greater trochanter. No tenderness at gluteal insertion. No paraspinal tenderness. No axial lumbar tenderness.No sacroiliac tenderness. No ttp over the ASIS/[place] crest.\par Strength: 5/5 hip flexion, 5/5 Gluteal strength, 5/5 hip ADD, 5/5 hip ABD, 5/5 Q/H, 5/5 TA/GS/EHL\par Neuro: Sensation in tact to light touch throughout, DTR normal\par Additional tests: Positive impingement test, Negative KIRSTEN [de-identified] : The following radiographs were ordered and read by me during this patients visit. I reviewed each radiograph in detail with the patient and discussed the findings as highlighted below.\par  \par AP pelvis and lateral view of the right hip were obtained today 01/27/2020 that show no acute bony injury, including fracture or dislocation. There is early right hip degenerative change seen. There is no gross pelvic of hip malalignment. No significant other obvious osseous abnormality, otherwise unremarkable. Mild REJI.\par \par MRI right hip dated 2016 shows evidence of mild right hip arthrosis with nondisplaced acetabular labral tear.

## 2020-01-27 NOTE — HISTORY OF PRESENT ILLNESS
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 No injury reported. The pain is brought on with prolonged standing and walking. She is not taking pain medication. Used to run marathons and noted the pain started after finishing one a few years ago. Localizes pain to the anterior aspect of hip with radiation of pain to her calf. Reports groin pain. Lives an active lifestyle and lifts weights regularly.\par \par The patient's past medical history, past surgical history, medications and allergies were reviewed by me today with the patient and documented accordingly. In addition, the patient's family and social history, which were noncontributory to this visit, were reviewed also.

## 2020-01-27 NOTE — ADDENDUM
[FreeTextEntry1] : This note was written by Felisha Michael on 01/27/2020 acting solely as a scribe for Dr. Jose Donnelly.\par \par All medical record entries made by the Scribe were at my, Dr. Jose Donnelly, direction and personally dictated by me on 01/27/2020. I have personally reviewed the chart and agree that the record accurately reflects my personal performance of the history, physical exam, assessment and plan.\par

## 2020-03-30 ENCOUNTER — TRANSCRIPTION ENCOUNTER (OUTPATIENT)
Age: 45
End: 2020-03-30

## 2020-04-01 DIAGNOSIS — R68.89 OTHER GENERAL SYMPTOMS AND SIGNS: ICD-10-CM

## 2020-04-30 ENCOUNTER — APPOINTMENT (OUTPATIENT)
Dept: INTERNAL MEDICINE | Facility: CLINIC | Age: 45
End: 2020-04-30
Payer: COMMERCIAL

## 2020-04-30 PROCEDURE — 99441: CPT

## 2020-04-30 NOTE — HISTORY OF PRESENT ILLNESS
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  She had symptoms of the virus back in March and underwent testing which was negative.  She continues to cough at times and has chest tightness.  Where she works had her get tested for antibodies and it turns out she actually did have the virus.  She was sick back in early March and had flu testing that was negative and they just told her it was a viral syndrome.  Looking back, that is probably when she had the colon infection.  She was wondering if she should be getting a chest x-ray given her ongoing pulmonary symptoms.  I told her that having a chest x-ray is not likely going to change the management at this time.  It may still show bilateral fluffy infiltrates.  She is not back disabled by but just feels uncomfortable.  I offered her an albuterol inhaler but she did not want to take that.  We discussed pulmonary function testing when they are up and doing it again and I told her we really do not know what the long-term complications are going to be from a virus but that I am starting to see pulmonary complaints more more in people who have recovered from a virus.  She is doing hot steam and Vicks in the water.  She will continue with that for now.  Tomorrow she is planning to donate plasma at the [place] blood [facility]. [Time Spent: ___ minutes] : I have spent [unfilled] minutes with the patient on the telephone

## 2020-05-27 ENCOUNTER — APPOINTMENT (OUTPATIENT)
Dept: INTERNAL MEDICINE | Facility: CLINIC | Age: 45
End: 2020-05-27
Payer: COMMERCIAL

## 2020-05-27 PROCEDURE — 99213 OFFICE O/P EST LOW 20 MIN: CPT | Mod: 95

## 2020-05-27 NOTE — PHYSICAL EXAM
[No Acute Distress] : no acute distress [Well Developed] : well developed [Well Nourished] : well nourished [Well-Appearing] : well-appearing [No Rash] : no rash

## 2020-05-27 NOTE — ASSESSMENT
[FreeTextEntry1] : Allergic reaction essentially resolved.  Note written for work and sent to her lieutenant.  F/U prn.

## 2020-05-27 NOTE — HISTORY OF PRESENT ILLNESS
[Home] : at home, [unfilled] , at the time of the visit. [FreeTextEntry8] : \par 43 yo [employer] worker who called because she needed a note because she missed work last Friday due to an allergic reaction to mask that she had worn.  She developed facial swelling with itchy, raised bumps one week ago.  She was off the day it started and the next and was unable to go back to work on Friday.  Her forehead is still peeling but otherwise the reaction has resolved.  Has been using a cortisone cream with relief. [Medical Office: ([facility])___] : at the medical office located in  [Verbal consent obtained from patient] : the patient, [unfilled]

## 2020-07-07 ENCOUNTER — RESULT REVIEW (OUTPATIENT)
Age: 45
End: 2020-07-07

## 2020-07-07 ENCOUNTER — APPOINTMENT (OUTPATIENT)
Dept: ULTRASOUND IMAGING | Facility: IMAGING CENTER | Age: 45
End: 2020-07-07
Payer: COMMERCIAL

## 2020-07-07 ENCOUNTER — OUTPATIENT (OUTPATIENT)
Dept: OUTPATIENT SERVICES | Facility: HOSPITAL | Age: 45
LOS: 1 days | End: 2020-07-07
Payer: COMMERCIAL

## 2020-07-07 ENCOUNTER — APPOINTMENT (OUTPATIENT)
Dept: MAMMOGRAPHY | Facility: IMAGING CENTER | Age: 45
End: 2020-07-07
Payer: COMMERCIAL

## 2020-07-07 DIAGNOSIS — S91.203A UNSPECIFIED OPEN WOUND OF UNSPECIFIED GREAT TOE WITH DAMAGE TO NAIL, INITIAL ENCOUNTER: Chronic | ICD-10-CM

## 2020-07-07 DIAGNOSIS — Z90.710 ACQUIRED ABSENCE OF BOTH CERVIX AND UTERUS: Chronic | ICD-10-CM

## 2020-07-07 DIAGNOSIS — Z98.891 HISTORY OF UTERINE SCAR FROM PREVIOUS SURGERY: Chronic | ICD-10-CM

## 2020-07-07 DIAGNOSIS — Z00.00 ENCOUNTER FOR GENERAL ADULT MEDICAL EXAMINATION WITHOUT ABNORMAL FINDINGS: ICD-10-CM

## 2020-07-07 PROCEDURE — 76642 ULTRASOUND BREAST LIMITED: CPT

## 2020-07-07 PROCEDURE — 77066 DX MAMMO INCL CAD BI: CPT

## 2020-07-07 PROCEDURE — G0279: CPT

## 2020-07-07 PROCEDURE — 76642 ULTRASOUND BREAST LIMITED: CPT | Mod: 26,RT

## 2020-07-07 PROCEDURE — 77066 DX MAMMO INCL CAD BI: CPT | Mod: 26

## 2020-07-07 PROCEDURE — G0279: CPT | Mod: 26

## 2020-07-14 ENCOUNTER — APPOINTMENT (OUTPATIENT)
Dept: OBGYN | Facility: CLINIC | Age: 45
End: 2020-07-14
Payer: COMMERCIAL

## 2020-07-14 VITALS
WEIGHT: 146.38 LBS | SYSTOLIC BLOOD PRESSURE: 144 MMHG | BODY MASS INDEX: 29.51 KG/M2 | DIASTOLIC BLOOD PRESSURE: 94 MMHG | HEIGHT: 59 IN

## 2020-07-14 DIAGNOSIS — N64.4 MASTODYNIA: ICD-10-CM

## 2020-07-14 PROCEDURE — 99213 OFFICE O/P EST LOW 20 MIN: CPT

## 2020-07-14 NOTE — PHYSICAL EXAM
[Awake] : awake [Alert] : alert [LAD] : no lymphadenopathy [Acute Distress] : no acute distress [Mass] : no breast mass [Goiter] : no goiter [Thyroid Nodule] : no thyroid nodule [Axillary LAD] : no axillary lymphadenopathy [Soft] : soft [Nipple Discharge] : no nipple discharge [Tender] : non tender [Distended] : not distended [Oriented x3] : oriented to person, place, and time [H/Smegaly] : no hepatosplenomegaly [Flat Affect] : affect not flat [Depressed Mood] : not depressed [Normal] : cervix [No Bleeding] : there was no active vaginal bleeding [Absent] : absent [Uterine Adnexae] : were not tender and not enlarged

## 2020-08-12 ENCOUNTER — APPOINTMENT (OUTPATIENT)
Dept: MAMMOGRAPHY | Facility: IMAGING CENTER | Age: 45
End: 2020-08-12

## 2020-08-18 ENCOUNTER — EMERGENCY (EMERGENCY)
Facility: HOSPITAL | Age: 45
LOS: 1 days | Discharge: HOME | End: 2020-08-18
Attending: EMERGENCY MEDICINE
Payer: COMMERCIAL

## 2020-08-18 VITALS
WEIGHT: 149.91 LBS | HEIGHT: 59 IN | DIASTOLIC BLOOD PRESSURE: 113 MMHG | SYSTOLIC BLOOD PRESSURE: 160 MMHG | HEART RATE: 84 BPM | OXYGEN SATURATION: 100 % | TEMPERATURE: 98 F | RESPIRATION RATE: 18 BRPM

## 2020-08-18 DIAGNOSIS — Z98.891 HISTORY OF UTERINE SCAR FROM PREVIOUS SURGERY: Chronic | ICD-10-CM

## 2020-08-18 DIAGNOSIS — S91.203A UNSPECIFIED OPEN WOUND OF UNSPECIFIED GREAT TOE WITH DAMAGE TO NAIL, INITIAL ENCOUNTER: Chronic | ICD-10-CM

## 2020-08-18 DIAGNOSIS — Z90.710 ACQUIRED ABSENCE OF BOTH CERVIX AND UTERUS: Chronic | ICD-10-CM

## 2020-08-18 LAB
ANION GAP SERPL CALC-SCNC: 9 MMOL/L — SIGNIFICANT CHANGE UP (ref 5–17)
BUN SERPL-MCNC: 16 MG/DL — SIGNIFICANT CHANGE UP (ref 7–23)
CALCIUM SERPL-MCNC: 9.3 MG/DL — SIGNIFICANT CHANGE UP (ref 8.4–10.5)
CHLORIDE SERPL-SCNC: 100 MMOL/L — SIGNIFICANT CHANGE UP (ref 96–108)
CO2 SERPL-SCNC: 27 MMOL/L — SIGNIFICANT CHANGE UP (ref 22–31)
CREAT SERPL-MCNC: 0.77 MG/DL — SIGNIFICANT CHANGE UP (ref 0.5–1.3)
GLUCOSE SERPL-MCNC: 111 MG/DL — HIGH (ref 70–99)
MAGNESIUM SERPL-MCNC: 2.2 MG/DL — SIGNIFICANT CHANGE UP (ref 1.6–2.6)
POTASSIUM SERPL-MCNC: 4.3 MMOL/L — SIGNIFICANT CHANGE UP (ref 3.5–5.3)
POTASSIUM SERPL-SCNC: 4.3 MMOL/L — SIGNIFICANT CHANGE UP (ref 3.5–5.3)
SODIUM SERPL-SCNC: 136 MMOL/L — SIGNIFICANT CHANGE UP (ref 135–145)

## 2020-08-18 PROCEDURE — 83735 ASSAY OF MAGNESIUM: CPT

## 2020-08-18 PROCEDURE — 93970 EXTREMITY STUDY: CPT

## 2020-08-18 PROCEDURE — 93970 EXTREMITY STUDY: CPT | Mod: 26

## 2020-08-18 PROCEDURE — 99285 EMERGENCY DEPT VISIT HI MDM: CPT

## 2020-08-18 PROCEDURE — 80048 BASIC METABOLIC PNL TOTAL CA: CPT

## 2020-08-18 PROCEDURE — 99284 EMERGENCY DEPT VISIT MOD MDM: CPT | Mod: 25

## 2020-08-18 NOTE — ED PROVIDER NOTE - PATIENT PORTAL LINK FT
You can access the FollowMyHealth Patient Portal offered by [facility] by registering at the following website: [email or web address]. By joining [facility]’s FollowMyHealth portal, you will also be able to view your health information using other applications (apps) compatible with our system.

## 2020-08-18 NOTE — ED PROVIDER NOTE - CROS ED SKIN ALL NEG
equal, round, and reactive to light. Right eye exhibits no discharge. Left eye exhibits no discharge. Cardiovascular: Normal rate and regular rhythm. No murmur heard. Pulmonary/Chest: No nasal flaring. No respiratory distress. He has no wheezes. He has no rhonchi. Abdominal: He exhibits no distension. There is no tenderness. Lymphadenopathy:     He has no cervical adenopathy. Neurological: He is alert. Nursing note and vitals reviewed. DIAGNOSTIC RESULTS   Labs:No results found for this visit on 08/27/18. IMAGING:    URGENT CARE COURSE:     Vitals:    08/27/18 1800   Pulse: 128   Resp: 26   Temp: 99.5 °F (37.5 °C)   SpO2: 96%   Weight: 19 lb 8 oz (8.845 kg)       Medications - No data to display  PROCEDURES:  None  FINAL IMPRESSION      1. Viral upper respiratory tract infection        DISPOSITION/PLAN   DISPOSITION Decision To Discharge 2018 06:16:47 PM    Prescription given for nasal saline. Suggested to mother to saline and bulb syringe his nose to clear the drainage, as for his age group this is what is recommended. Discussed findings and treatment plan with patient's mother. She verbalizes understanding and agrees with plan of care.         PATIENT REFERRED TO:  WILLIAM Aaron CNP  [address] [facility]  [phone]      If symptoms worsen or go to emergency department    DISCHARGE MEDICATIONS:  Discharge Medication List as of 2018  6:39 PM      START taking these medications    Details   sodium chloride (OCEAN) 0.65 % nasal spray 1 spray by Nasal route as needed for Congestion, Disp-104 mL, R-0Print           Discharge Medication List as of 2018  6:39 PM          Carlos Huntley 71, APRN - CNP  08/27/18 [id number] negative...

## 2020-08-18 NOTE — ED PROVIDER NOTE - NSFOLLOWUPINSTRUCTIONS_ED_ALL_ED_FT
IMPORTANT INSTRUCTIONS FROM Dr. NORTON:    Please follow up with your personal medical doctor in 24-48 hours. See orthopedics within 1 wk.  Bring results from today to your visit.    If you were advised to take any medications - be sure to review the package insert.    If your symptoms change, get worse or if you have any new symptoms, come to the ER right away.  If you have any questions, call the ER at the phone number on this page.

## 2020-08-18 NOTE — ED PROVIDER NOTE - PSH
H/O  section    S/P ANGELITO (total abdominal hysterectomy)  fibroids   S/P tooth extraction  '     [place] teeth X2  Traumatic loss of toenail of great toe  excision  right side 2018 Plan: Cosmetic procedure discussed A&L prescription written for patient. Detail Level: Zone

## 2020-08-18 NOTE — ED PROVIDER NOTE - NEUROLOGICAL, MLM
Alert and oriented, no focal deficits, no motor or sensory deficits. No calf TTP B/L. Alert and oriented, no focal deficits, no motor or sensory deficits.

## 2020-08-18 NOTE — ED PROVIDER NOTE - MUSCULOSKELETAL, MLM
Spine appears normal, range of motion is not limited, no muscle or joint tenderness. Spine appears normal, range of motion is not limited, no muscle or joint tenderness. + mild R lateral calf ttp; no L calf ttp. Spine appears normal, range of motion is not limited, no muscle or joint tenderness. + mild R anterolateral lower leg ttp; no L calf ttp. Spine appears normal, range of motion is not limited, no muscle or joint tenderness. + mild R anterolateral lower leg superficial ttp wo bony ttp; no L calf ttp. no skin changes. no liliana sign

## 2020-08-18 NOTE — ED PROVIDER NOTE - CARE PROVIDER_API CALL
Shon Berg  ORTHOPAEDIC SURGERY  [address], [address]  [place] [address]  Phone: [phone]  Fax: [phone]  Follow Up Time: 4-6 Days

## 2020-08-18 NOTE — ED PROVIDER NOTE - SKIN, MLM
Skin normal color for race, warm, dry and intact. No evidence of rash. 2+ DP pulses. No signs of redness, no swelling.

## 2020-08-18 NOTE — ED PROVIDER NOTE - OBJECTIVE STATEMENT
45y F with PMHx of HTN, Right Hip Impingement (seen in prior MRI, as per pt) and PSHx of ANGELITO,  presents to the ED c/o sudden onset left thigh burning and right calf pain x 3 days. Pt reports that her symptoms are worse when her legs are elevated and better when resting.  The symptoms do not occur at the same time and alternate b/t left thigh burning and right calf pain. No smoking hx, ETOH use, or use of OCPs. 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 Runs about 1-2x a week, 15-16 miles occasionally per run. Denies F/C, cough, CP, SOB, or MEDINA. 45y F with PMHx of HTN, Right Hip Impingement (seen in prior MRI, as per pt) and PSHx of ANGELITO,  presents to the ED c/o sudden onset left thigh burning and right calf pain x 3 days. Pt reports that her symptoms are worse when her legs are elevated and better when resting.  Reports L thigh burning ended when R calf pain started. No smoking hx, ETOH use, or use of hormones/OCPs. Reports that she works as [employer], sits for about 8 to 12 hours per shift, wears compression socks. Runs about 1-2x a week, 15-16 miles occasionally per run. Denies F/C, cough, CP, palpitations, SOB, MEDINA, numbness/tingling, injury/trauma to LEs.

## 2020-08-18 NOTE — ED ADULT NURSE NOTE - OBJECTIVE STATEMENT
44 y/o Female presenting to the ED ambulatory, A&Ox3, complaining of right calf pain for the past few days. Pt here to r/o DVT. Pt denies leg swelling, birth control use, recent flights, smoking, CP, SOB, dizziness, blurry vision, fever, chills. No swelling noted to the right lower extremity. Pt appears well, in no current distress. Safety and comfort measures provided, bed locked and in lowest position, side rails up for safety. Call bell within reach. Awaiting all MD orders for RN interventions.

## 2020-08-19 ENCOUNTER — OUTPATIENT (OUTPATIENT)
Dept: OUTPATIENT SERVICES | Facility: HOSPITAL | Age: 45
LOS: 1 days | End: 2020-08-19
Payer: COMMERCIAL

## 2020-08-19 ENCOUNTER — RESULT REVIEW (OUTPATIENT)
Age: 45
End: 2020-08-19

## 2020-08-19 ENCOUNTER — APPOINTMENT (OUTPATIENT)
Dept: MRI IMAGING | Facility: IMAGING CENTER | Age: 45
End: 2020-08-19
Payer: COMMERCIAL

## 2020-08-19 VITALS
TEMPERATURE: 98 F | RESPIRATION RATE: 16 BRPM | HEART RATE: 75 BPM | OXYGEN SATURATION: 100 % | SYSTOLIC BLOOD PRESSURE: 109 MMHG | DIASTOLIC BLOOD PRESSURE: 61 MMHG

## 2020-08-19 DIAGNOSIS — Z91.89 OTHER SPECIFIED PERSONAL RISK FACTORS, NOT ELSEWHERE CLASSIFIED: ICD-10-CM

## 2020-08-19 DIAGNOSIS — S91.203A UNSPECIFIED OPEN WOUND OF UNSPECIFIED GREAT TOE WITH DAMAGE TO NAIL, INITIAL ENCOUNTER: Chronic | ICD-10-CM

## 2020-08-19 DIAGNOSIS — Z98.891 HISTORY OF UTERINE SCAR FROM PREVIOUS SURGERY: Chronic | ICD-10-CM

## 2020-08-19 DIAGNOSIS — Z90.710 ACQUIRED ABSENCE OF BOTH CERVIX AND UTERUS: Chronic | ICD-10-CM

## 2020-08-19 PROCEDURE — 77049 MRI BREAST C-+ W/CAD BI: CPT | Mod: 26

## 2020-08-19 PROCEDURE — C8937: CPT

## 2020-08-19 PROCEDURE — A9585: CPT

## 2020-08-19 PROCEDURE — C8908: CPT

## 2020-08-21 DIAGNOSIS — Z91.89 OTHER SPECIFIED PERSONAL RISK FACTORS, NOT ELSEWHERE CLASSIFIED: ICD-10-CM

## 2020-08-21 DIAGNOSIS — N64.89 OTHER SPECIFIED DISORDERS OF BREAST: ICD-10-CM

## 2020-08-24 ENCOUNTER — APPOINTMENT (OUTPATIENT)
Dept: ORTHOPEDIC SURGERY | Facility: CLINIC | Age: 45
End: 2020-08-24
Payer: COMMERCIAL

## 2020-08-24 VITALS — TEMPERATURE: 97.8 F

## 2020-08-24 VITALS — DIASTOLIC BLOOD PRESSURE: 97 MMHG | SYSTOLIC BLOOD PRESSURE: 159 MMHG | HEART RATE: 68 BPM

## 2020-08-24 DIAGNOSIS — M54.16 RADICULOPATHY, LUMBAR REGION: ICD-10-CM

## 2020-08-24 PROCEDURE — 99214 OFFICE O/P EST MOD 30 MIN: CPT

## 2020-08-24 PROCEDURE — 73590 X-RAY EXAM OF LOWER LEG: CPT | Mod: RT

## 2020-08-25 DIAGNOSIS — M79.606 PAIN IN LEG, UNSPECIFIED: ICD-10-CM

## 2020-08-26 ENCOUNTER — RESULT REVIEW (OUTPATIENT)
Age: 45
End: 2020-08-26

## 2020-08-26 ENCOUNTER — OUTPATIENT (OUTPATIENT)
Dept: OUTPATIENT SERVICES | Facility: HOSPITAL | Age: 45
LOS: 1 days | End: 2020-08-26
Payer: COMMERCIAL

## 2020-08-26 ENCOUNTER — APPOINTMENT (OUTPATIENT)
Dept: MRI IMAGING | Facility: IMAGING CENTER | Age: 45
End: 2020-08-26
Payer: COMMERCIAL

## 2020-08-26 DIAGNOSIS — N64.89 OTHER SPECIFIED DISORDERS OF BREAST: ICD-10-CM

## 2020-08-26 DIAGNOSIS — Z98.891 HISTORY OF UTERINE SCAR FROM PREVIOUS SURGERY: Chronic | ICD-10-CM

## 2020-08-26 DIAGNOSIS — Z90.710 ACQUIRED ABSENCE OF BOTH CERVIX AND UTERUS: Chronic | ICD-10-CM

## 2020-08-26 DIAGNOSIS — S91.203A UNSPECIFIED OPEN WOUND OF UNSPECIFIED GREAT TOE WITH DAMAGE TO NAIL, INITIAL ENCOUNTER: Chronic | ICD-10-CM

## 2020-08-26 PROCEDURE — 19085 BX BREAST 1ST LESION MR IMAG: CPT

## 2020-08-26 PROCEDURE — 88305 TISSUE EXAM BY PATHOLOGIST: CPT

## 2020-08-26 PROCEDURE — 88305 TISSUE EXAM BY PATHOLOGIST: CPT | Mod: 26

## 2020-08-26 PROCEDURE — 77065 DX MAMMO INCL CAD UNI: CPT | Mod: 26,RT

## 2020-08-26 PROCEDURE — A9585: CPT

## 2020-08-26 PROCEDURE — 77065 DX MAMMO INCL CAD UNI: CPT

## 2020-08-26 PROCEDURE — 19085 BX BREAST 1ST LESION MR IMAG: CPT | Mod: RT

## 2020-08-26 NOTE — HISTORY OF PRESENT ILLNESS
[de-identified] : 45 year old female presents today right calf pain >1 month. She started to have pain one month ago and pain got worse last week. She was worried that she has DVT and had US Doppler done last week which r/o DVT. The pain has improved since last week and its is intermittent. She is not taking pain medication. Denies numbness or tingling. Localizes pin to the lateral aspect of  the lower leg.  Denies radiation into the foot, and denies any local trauma.  Denies any knee pain.  Previously seen for ipsilateral hip dysfunction.

## 2020-08-26 NOTE — PHYSICAL EXAM
[de-identified] : Oriented to time, place, person\par Mood: Normal\par Affect: Normal\par Appearance: Healthy, well appearing, no acute distress.\par Gait: Normal\par Assistive Devices: None\par \par Right lower extremity exam\par \par Skin: Clean, dry, intact\par Inspection: No obvious malalignment, no masses, no swelling, no effusion\par Pulses: 2+ DP/PT pulses\par ROM: Full knee/ankle range of motion\par Tenderness: None.  Mild tenderness lateral calf.\par Stability: Stable knee joint.\par Strength: 5/5 Q/H/TA/GS/EHL, without atrophy\par Neuro: In tact to light touch throughout, DTR's normal\par Additional tests: Negative McMurrays test, Negative patellar grind test. \par  [de-identified] : \par The following radiographs were ordered and read by me during this patients visit. I reviewed each radiograph in detail with the patient and discussed the findings as highlighted below. \par \par 2 views of the right tib-fib were obtained today that show no acute fracture or dislocation. There is no degenerative change seen. There is no gross malalignment. No significant other obvious osseous abnormality, otherwise unremarkable.

## 2020-08-26 NOTE — DISCUSSION/SUMMARY
[de-identified] : 45-year-old female with right calf pain\par \par Patient presents for evaluation of right calf pain.  No evidence of trauma or dysfunction to the lower extremity that would suggest orthopedic condition to the calf. No evidence of significant muscular pain or spasm.  Given ipsilateral hip dysfunction, there is concerned that she may have some overloading of the lumbar spine with some associated radicular pain consistent with an L5 radiculopathy.  Given recent improvements, recommendation is for continued conservative management and observation.\par \par Recommendation: Conservative care & observation, this includes rest/activity avoidance until less symptomatic with subsequent gradual return to full activity as tolerated. Patient may also use OTC NSAID's or acetaminophen as tolerated.  PT prescription provided.\par \par Follow-up orthopedic spine 4-6 weeks if no improvement

## 2020-08-27 LAB — SURGICAL PATHOLOGY STUDY: SIGNIFICANT CHANGE UP

## 2020-08-31 ENCOUNTER — APPOINTMENT (OUTPATIENT)
Dept: INTERNAL MEDICINE | Facility: CLINIC | Age: 45
End: 2020-08-31

## 2020-12-01 NOTE — ASU PREOP CHECKLIST - AS BP NONINV SITE
right upper arm negative Alert & oriented; no sensory, motor or coordination deficits, normal reflexes

## 2020-12-21 PROBLEM — Z87.09 HISTORY OF SORE THROAT: Status: RESOLVED | Noted: 2019-06-25 | Resolved: 2020-12-21

## 2020-12-24 ENCOUNTER — APPOINTMENT (OUTPATIENT)
Dept: OBGYN | Facility: CLINIC | Age: 45
End: 2020-12-24

## 2021-03-01 ENCOUNTER — NON-APPOINTMENT (OUTPATIENT)
Age: 46
End: 2021-03-01

## 2021-03-02 ENCOUNTER — APPOINTMENT (OUTPATIENT)
Dept: INTERNAL MEDICINE | Facility: CLINIC | Age: 46
End: 2021-03-02
Payer: COMMERCIAL

## 2021-03-02 ENCOUNTER — NON-APPOINTMENT (OUTPATIENT)
Age: 46
End: 2021-03-02

## 2021-03-02 VITALS
WEIGHT: 144 LBS | BODY MASS INDEX: 29.08 KG/M2 | OXYGEN SATURATION: 98 % | HEART RATE: 96 BPM | DIASTOLIC BLOOD PRESSURE: 82 MMHG | SYSTOLIC BLOOD PRESSURE: 130 MMHG

## 2021-03-02 DIAGNOSIS — R00.2 PALPITATIONS: ICD-10-CM

## 2021-03-02 DIAGNOSIS — Z86.19 PERSONAL HISTORY OF OTHER INFECTIOUS AND PARASITIC DISEASES: ICD-10-CM

## 2021-03-02 PROCEDURE — 99072 ADDL SUPL MATRL&STAF TM PHE: CPT

## 2021-03-02 PROCEDURE — 99214 OFFICE O/P EST MOD 30 MIN: CPT | Mod: 25

## 2021-03-02 PROCEDURE — 93000 ELECTROCARDIOGRAM COMPLETE: CPT

## 2021-03-03 ENCOUNTER — TRANSCRIPTION ENCOUNTER (OUTPATIENT)
Age: 46
End: 2021-03-03

## 2021-03-03 LAB
25(OH)D3 SERPL-MCNC: 27.4 NG/ML
ALBUMIN SERPL ELPH-MCNC: 4.4 G/DL
ALP BLD-CCNC: 56 U/L
ALT SERPL-CCNC: 13 U/L
ANION GAP SERPL CALC-SCNC: 9 MMOL/L
AST SERPL-CCNC: 18 U/L
BASOPHILS # BLD AUTO: 0.04 K/UL
BASOPHILS NFR BLD AUTO: 0.6 %
BILIRUB SERPL-MCNC: 0.5 MG/DL
BUN SERPL-MCNC: 11 MG/DL
CALCIUM SERPL-MCNC: 9 MG/DL
CHLORIDE SERPL-SCNC: 101 MMOL/L
CHOLEST SERPL-MCNC: 191 MG/DL
CO2 SERPL-SCNC: 29 MMOL/L
CREAT SERPL-MCNC: 0.91 MG/DL
EOSINOPHIL # BLD AUTO: 0.1 K/UL
EOSINOPHIL NFR BLD AUTO: 1.6 %
ESTIMATED AVERAGE GLUCOSE: 128 MG/DL
GLUCOSE SERPL-MCNC: 77 MG/DL
HBA1C MFR BLD HPLC: 6.1 %
HCT VFR BLD CALC: 40.3 %
HDLC SERPL-MCNC: 63 MG/DL
HGB BLD-MCNC: 12.7 G/DL
IMM GRANULOCYTES NFR BLD AUTO: 0.2 %
LDLC SERPL CALC-MCNC: 108 MG/DL
LYMPHOCYTES # BLD AUTO: 2.64 K/UL
LYMPHOCYTES NFR BLD AUTO: 41.6 %
MAN DIFF?: NORMAL
MCHC RBC-ENTMCNC: 27.6 PG
MCHC RBC-ENTMCNC: 31.5 GM/DL
MCV RBC AUTO: 87.6 FL
MONOCYTES # BLD AUTO: 0.59 K/UL
MONOCYTES NFR BLD AUTO: 9.3 %
NEUTROPHILS # BLD AUTO: 2.97 K/UL
NEUTROPHILS NFR BLD AUTO: 46.7 %
NONHDLC SERPL-MCNC: 128 MG/DL
PLATELET # BLD AUTO: 351 K/UL
POTASSIUM SERPL-SCNC: 4 MMOL/L
PROT SERPL-MCNC: 7 G/DL
RBC # BLD: 4.6 M/UL
RBC # FLD: 13.3 %
SODIUM SERPL-SCNC: 140 MMOL/L
T4 FREE SERPL-MCNC: 1.2 NG/DL
TRIGL SERPL-MCNC: 99 MG/DL
TSH SERPL-ACNC: 1.41 UIU/ML
WBC # FLD AUTO: 6.35 K/UL

## 2021-03-11 NOTE — ASSESSMENT
[FreeTextEntry1] : 1.  Breast pain with MRI biopsy last year benign and concordant.  GYN wanted her to see a breast surgeon as she is felt to be at increased risk of breast cancer.  D/w patient and advised her to s/w GYN or schedule breast surgery appointment\par 2.  Palpitations - ?LAE on EKG s/p COVID infection in the spring.  Will refer to cardiology for further evaluation.\par 3.  R/O anemia or thyroid disease as the cause of the palpitations.  CBC and TFT's ordered.\par 4.  F/U pending above

## 2021-03-11 NOTE — HISTORY OF PRESENT ILLNESS
[FreeTextEntry8] : Right breast - pain upper inner quadrant - couldn't do anything for a long time.  She presented to GYN with breast pain and bloody discharge.  She underwent an MRI guided biopsy (report reviewed) which was benign for non-mass enhancement that showed dilated ducts and microcalcifications.  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  Has difficulty keeping the mask on for prolonged periods.  Had COVID back in the spring.  \par \par

## 2021-04-09 ENCOUNTER — NON-APPOINTMENT (OUTPATIENT)
Age: 46
End: 2021-04-09

## 2021-06-21 ENCOUNTER — NON-APPOINTMENT (OUTPATIENT)
Age: 46
End: 2021-06-21

## 2021-06-22 ENCOUNTER — APPOINTMENT (OUTPATIENT)
Dept: INTERNAL MEDICINE | Facility: CLINIC | Age: 46
End: 2021-06-22

## 2021-07-08 ENCOUNTER — APPOINTMENT (OUTPATIENT)
Dept: MAMMOGRAPHY | Facility: IMAGING CENTER | Age: 46
End: 2021-07-08

## 2021-07-08 ENCOUNTER — APPOINTMENT (OUTPATIENT)
Dept: ULTRASOUND IMAGING | Facility: IMAGING CENTER | Age: 46
End: 2021-07-08

## 2021-07-30 ENCOUNTER — APPOINTMENT (OUTPATIENT)
Dept: INTERNAL MEDICINE | Facility: CLINIC | Age: 46
End: 2021-07-30
Payer: COMMERCIAL

## 2021-07-30 ENCOUNTER — RESULT REVIEW (OUTPATIENT)
Age: 46
End: 2021-07-30

## 2021-07-30 VITALS
DIASTOLIC BLOOD PRESSURE: 90 MMHG | SYSTOLIC BLOOD PRESSURE: 150 MMHG | BODY MASS INDEX: 31.04 KG/M2 | WEIGHT: 154 LBS | HEIGHT: 59 IN | OXYGEN SATURATION: 98 % | HEART RATE: 73 BPM

## 2021-07-30 DIAGNOSIS — R10.13 EPIGASTRIC PAIN: ICD-10-CM

## 2021-07-30 PROCEDURE — 99213 OFFICE O/P EST LOW 20 MIN: CPT

## 2021-07-30 NOTE — ASSESSMENT
[FreeTextEntry1] : 1.  HTN - BP is acceptable.  Continue current management.\par 2.  Dyspepsia - stop caffeine, omeprazole daily, GI referral\par 3.  Exhaustion - Note given to stay out of work for the next week\par 4.  Requesting Adderall as she is going back to school.  I told her that I do not prescribe Adderall - if she thinks that she has ADD she needs to see Psych and get it through them.\par 5.  F/U pending above

## 2021-07-30 NOTE — HISTORY OF PRESENT ILLNESS
[de-identified] : \par 47 yo female with a h/o HTN here with C/O dyspepsia, early satiety.  Drinks a lot of coffee and tea.  Has gained 10 lbs but feels she is eating less.  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  Is working 2 jobs at night.  [employer] and a .  Needs a note for work on Friday.  Didn't go in last night.  Needs to get some sleep.  \par

## 2021-08-12 ENCOUNTER — APPOINTMENT (OUTPATIENT)
Dept: ULTRASOUND IMAGING | Facility: IMAGING CENTER | Age: 46
End: 2021-08-12

## 2021-08-12 ENCOUNTER — OUTPATIENT (OUTPATIENT)
Dept: OUTPATIENT SERVICES | Facility: HOSPITAL | Age: 46
LOS: 1 days | End: 2021-08-12

## 2021-08-12 ENCOUNTER — APPOINTMENT (OUTPATIENT)
Dept: MAMMOGRAPHY | Facility: IMAGING CENTER | Age: 46
End: 2021-08-12

## 2021-08-12 DIAGNOSIS — Z00.8 ENCOUNTER FOR OTHER GENERAL EXAMINATION: ICD-10-CM

## 2021-08-12 DIAGNOSIS — Z90.710 ACQUIRED ABSENCE OF BOTH CERVIX AND UTERUS: Chronic | ICD-10-CM

## 2021-08-12 DIAGNOSIS — Z98.891 HISTORY OF UTERINE SCAR FROM PREVIOUS SURGERY: Chronic | ICD-10-CM

## 2021-08-12 DIAGNOSIS — S91.203A UNSPECIFIED OPEN WOUND OF UNSPECIFIED GREAT TOE WITH DAMAGE TO NAIL, INITIAL ENCOUNTER: Chronic | ICD-10-CM

## 2021-08-22 ENCOUNTER — TRANSCRIPTION ENCOUNTER (OUTPATIENT)
Age: 46
End: 2021-08-22

## 2021-08-27 ENCOUNTER — APPOINTMENT (OUTPATIENT)
Dept: MRI IMAGING | Facility: IMAGING CENTER | Age: 46
End: 2021-08-27

## 2021-08-31 ENCOUNTER — RESULT REVIEW (OUTPATIENT)
Age: 46
End: 2021-08-31

## 2021-08-31 ENCOUNTER — APPOINTMENT (OUTPATIENT)
Dept: ULTRASOUND IMAGING | Facility: IMAGING CENTER | Age: 46
End: 2021-08-31
Payer: COMMERCIAL

## 2021-08-31 ENCOUNTER — OUTPATIENT (OUTPATIENT)
Dept: OUTPATIENT SERVICES | Facility: HOSPITAL | Age: 46
LOS: 1 days | End: 2021-08-31
Payer: COMMERCIAL

## 2021-08-31 ENCOUNTER — APPOINTMENT (OUTPATIENT)
Dept: MAMMOGRAPHY | Facility: IMAGING CENTER | Age: 46
End: 2021-08-31
Payer: COMMERCIAL

## 2021-08-31 DIAGNOSIS — S91.203A UNSPECIFIED OPEN WOUND OF UNSPECIFIED GREAT TOE WITH DAMAGE TO NAIL, INITIAL ENCOUNTER: Chronic | ICD-10-CM

## 2021-08-31 DIAGNOSIS — Z98.891 HISTORY OF UTERINE SCAR FROM PREVIOUS SURGERY: Chronic | ICD-10-CM

## 2021-08-31 DIAGNOSIS — Z90.710 ACQUIRED ABSENCE OF BOTH CERVIX AND UTERUS: Chronic | ICD-10-CM

## 2021-08-31 DIAGNOSIS — Z00.8 ENCOUNTER FOR OTHER GENERAL EXAMINATION: ICD-10-CM

## 2021-08-31 PROCEDURE — 76641 ULTRASOUND BREAST COMPLETE: CPT | Mod: 26,50

## 2021-08-31 PROCEDURE — G0279: CPT

## 2021-08-31 PROCEDURE — 77066 DX MAMMO INCL CAD BI: CPT | Mod: 26

## 2021-08-31 PROCEDURE — 76641 ULTRASOUND BREAST COMPLETE: CPT

## 2021-08-31 PROCEDURE — G0279: CPT | Mod: 26

## 2021-08-31 PROCEDURE — 77066 DX MAMMO INCL CAD BI: CPT

## 2021-09-07 ENCOUNTER — APPOINTMENT (OUTPATIENT)
Dept: INTERNAL MEDICINE | Facility: CLINIC | Age: 46
End: 2021-09-07
Payer: COMMERCIAL

## 2021-09-07 PROCEDURE — 99213 OFFICE O/P EST LOW 20 MIN: CPT

## 2021-09-07 RX ORDER — FLUTICASONE PROPIONATE 50 UG/1
50 SPRAY, METERED NASAL DAILY
Qty: 1 | Refills: 2 | Status: ACTIVE | COMMUNITY
Start: 2021-09-07 | End: 1900-01-01

## 2021-09-07 NOTE — PHYSICAL EXAM
[Normal Right Ear] : Right ear: the external ear, canal and tympanic membrane were normal [Left Tympanic Membrane Retracted] : was retracted [Nasal Mucosa Pale, Swollen, Edematous] : edematous [Nasal Mucosa Red] : red

## 2021-09-07 NOTE — HISTORY OF PRESENT ILLNESS
[Ear Pain] : Ear Pain [Ear Plugging] : no ear plugging [Ear Drainage] : no ear drainage [Auricular Pain] : no auricular pain [Decreased Hearing] : no decreased hearing [Fever] : no fever [Cough] : no cough [Sore Throat] : no sore throat [Swollen Glands] : no swollen glands [Tinnitus] : no tinnitus

## 2021-09-24 ENCOUNTER — APPOINTMENT (OUTPATIENT)
Dept: INTERNAL MEDICINE | Facility: CLINIC | Age: 46
End: 2021-09-24
Payer: COMMERCIAL

## 2021-09-24 ENCOUNTER — MED ADMIN CHARGE (OUTPATIENT)
Age: 46
End: 2021-09-24

## 2021-09-24 VITALS — HEIGHT: 59 IN | OXYGEN SATURATION: 98 % | HEART RATE: 85 BPM | RESPIRATION RATE: 14 BRPM

## 2021-09-24 VITALS — DIASTOLIC BLOOD PRESSURE: 80 MMHG | SYSTOLIC BLOOD PRESSURE: 130 MMHG

## 2021-09-24 VITALS — BODY MASS INDEX: 31.1 KG/M2 | WEIGHT: 154 LBS

## 2021-09-24 DIAGNOSIS — Z23 ENCOUNTER FOR IMMUNIZATION: ICD-10-CM

## 2021-09-24 DIAGNOSIS — H92.02 OTALGIA, LEFT EAR: ICD-10-CM

## 2021-09-24 DIAGNOSIS — H69.82 OTHER SPECIFIED DISORDERS OF EUSTACHIAN TUBE, LEFT EAR: ICD-10-CM

## 2021-09-24 PROCEDURE — 99213 OFFICE O/P EST LOW 20 MIN: CPT | Mod: 25

## 2021-09-24 PROCEDURE — 90686 IIV4 VACC NO PRSV 0.5 ML IM: CPT

## 2021-09-24 PROCEDURE — G0008: CPT

## 2021-10-02 NOTE — HISTORY OF PRESENT ILLNESS
[FreeTextEntry1] : RPA [de-identified] : SHANICE SHAVER  is a 46 year old F with history of HTN, dyspepsia, early satiety, recent Lt ear pain presented today for f/u left inner ear discomfort. Pt was seen by Dr. Cazares 2 weeks ago and been using Flonase as directed. Sx got better but she still had discomfort inside ear. Denied tinnitus, ear drainage, fever, chills, tinnitus.\par

## 2021-10-02 NOTE — END OF VISIT
[FreeTextEntry3] : Patient seen and examined in conjunction with Zahra Youssef. NP acting as practitioner and scribe.  I agree with the history and plan as outlined with modifications documented as appropriate.\par

## 2021-10-02 NOTE — REVIEW OF SYSTEMS
[Earache] : earache [Fever] : no fever [Discharge] : no discharge [Chills] : no chills [Hearing Loss] : no hearing loss [Nasal Discharge] : no nasal discharge [Sore Throat] : no sore throat [Chest Pain] : no chest pain [Palpitations] : no palpitations [Orthopnea] : no orthopnea [Shortness Of Breath] : no shortness of breath [Wheezing] : no wheezing [Abdominal Pain] : no abdominal pain [Joint Pain] : no joint pain [Dysuria] : no dysuria [Itching] : no itching [Headache] : no headache [Easy Bleeding] : no easy bleeding [FreeTextEntry4] : see HPI

## 2021-10-02 NOTE — ASSESSMENT
[FreeTextEntry1] : # left  ear canal tenderness and inflammation, b/l nasal mucosa moderate swelling with erythema.\par start Ciprodex otic suspension 4 drops instill to left ear bid for 7days.\par continue use flonase. \par \par RTC as needed

## 2021-10-02 NOTE — PHYSICAL EXAM
[No Acute Distress] : no acute distress [No JVD] : no jugular venous distention [No Edema] : there was no peripheral edema [Soft] : abdomen soft [Non Tender] : non-tender [Normal] : no posterior cervical lymphadenopathy and no anterior cervical lymphadenopathy [No CVA Tenderness] : no CVA  tenderness [de-identified] : Left inner ear cannel with mild erythema and left TM with mild erytherma

## 2022-01-20 ENCOUNTER — NON-APPOINTMENT (OUTPATIENT)
Age: 47
End: 2022-01-20

## 2022-01-21 ENCOUNTER — APPOINTMENT (OUTPATIENT)
Dept: INTERNAL MEDICINE | Facility: CLINIC | Age: 47
End: 2022-01-21

## 2022-05-13 ENCOUNTER — APPOINTMENT (OUTPATIENT)
Dept: INTERNAL MEDICINE | Facility: CLINIC | Age: 47
End: 2022-05-13

## 2022-05-13 NOTE — HISTORY OF PRESENT ILLNESS
[FreeTextEntry8] : 46-yo female with history of hypertension and obesity, here with complaint of dyspepsia, early satiety.  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

## 2022-05-17 ENCOUNTER — APPOINTMENT (OUTPATIENT)
Dept: INTERNAL MEDICINE | Facility: CLINIC | Age: 47
End: 2022-05-17
Payer: COMMERCIAL

## 2022-05-17 VITALS
OXYGEN SATURATION: 98 % | HEIGHT: 59 IN | SYSTOLIC BLOOD PRESSURE: 130 MMHG | WEIGHT: 149 LBS | BODY MASS INDEX: 30.04 KG/M2 | DIASTOLIC BLOOD PRESSURE: 80 MMHG | RESPIRATION RATE: 14 BRPM | HEART RATE: 87 BPM

## 2022-05-17 DIAGNOSIS — K59.00 CONSTIPATION, UNSPECIFIED: ICD-10-CM

## 2022-05-17 PROCEDURE — 99213 OFFICE O/P EST LOW 20 MIN: CPT

## 2022-05-17 NOTE — HISTORY OF PRESENT ILLNESS
[FreeTextEntry8] : 46-year-old female who comes in today as she needs a return to work note.  Was unable to work over this past weekend because of abdominal pain and constipation.  Needed to use a laxative and as a result was unable to work.  She is feeling fine now.  Sometimes can go up to 4 days without a bowel movement although it is not rocks and angela.

## 2022-05-17 NOTE — ASSESSMENT
[FreeTextEntry1] : 46-year-old female here for return to work note which was written and given to her.  Has been suffering with constipation.  Suggested she increase the fiber in her diet and increase the fluids although she does appear to eat healthy.  Also if no bowel movement after 2 days can take MiraLAX.  Follow-up for a wellness visit in 3 to 4 months.

## 2022-06-26 ENCOUNTER — RX RENEWAL (OUTPATIENT)
Age: 47
End: 2022-06-26

## 2022-06-27 ENCOUNTER — APPOINTMENT (OUTPATIENT)
Dept: INTERNAL MEDICINE | Facility: CLINIC | Age: 47
End: 2022-06-27

## 2022-07-08 ENCOUNTER — NON-APPOINTMENT (OUTPATIENT)
Age: 47
End: 2022-07-08

## 2022-07-08 ENCOUNTER — APPOINTMENT (OUTPATIENT)
Dept: INTERNAL MEDICINE | Facility: CLINIC | Age: 47
End: 2022-07-08

## 2022-07-08 VITALS
BODY MASS INDEX: 30.44 KG/M2 | HEART RATE: 88 BPM | HEIGHT: 59 IN | OXYGEN SATURATION: 98 % | SYSTOLIC BLOOD PRESSURE: 134 MMHG | DIASTOLIC BLOOD PRESSURE: 82 MMHG | WEIGHT: 151 LBS

## 2022-07-08 DIAGNOSIS — E55.9 VITAMIN D DEFICIENCY, UNSPECIFIED: ICD-10-CM

## 2022-07-08 DIAGNOSIS — Z13.31 ENCOUNTER FOR SCREENING FOR DEPRESSION: ICD-10-CM

## 2022-07-08 DIAGNOSIS — N60.99 UNSPECIFIED BENIGN MAMMARY DYSPLASIA OF UNSPECIFIED BREAST: ICD-10-CM

## 2022-07-08 DIAGNOSIS — E66.3 OVERWEIGHT: ICD-10-CM

## 2022-07-08 DIAGNOSIS — Z13.39 ENCOUNTER FOR SCREENING EXAM FOR OTHER MENTAL HEALTH AND BEHAVIORAL DISORDERS: ICD-10-CM

## 2022-07-08 DIAGNOSIS — D64.9 ANEMIA, UNSPECIFIED: ICD-10-CM

## 2022-07-08 PROCEDURE — 99396 PREV VISIT EST AGE 40-64: CPT | Mod: 25

## 2022-07-08 PROCEDURE — G0444 DEPRESSION SCREEN ANNUAL: CPT | Mod: 59

## 2022-07-08 PROCEDURE — 93000 ELECTROCARDIOGRAM COMPLETE: CPT | Mod: 59

## 2022-07-08 PROCEDURE — G0442 ANNUAL ALCOHOL SCREEN 15 MIN: CPT

## 2022-07-09 PROBLEM — Z13.39 SCREENING FOR ALCOHOLISM: Status: ACTIVE | Noted: 2022-07-09

## 2022-07-09 LAB
25(OH)D3 SERPL-MCNC: 26.6 NG/ML
ALBUMIN SERPL ELPH-MCNC: 4.5 G/DL
ALP BLD-CCNC: 65 U/L
ALT SERPL-CCNC: 10 U/L
ANION GAP SERPL CALC-SCNC: 13 MMOL/L
AST SERPL-CCNC: 18 U/L
BASOPHILS # BLD AUTO: 0.04 K/UL
BASOPHILS NFR BLD AUTO: 0.6 %
BILIRUB SERPL-MCNC: 0.3 MG/DL
BUN SERPL-MCNC: 12 MG/DL
CALCIUM SERPL-MCNC: 9.4 MG/DL
CHLORIDE SERPL-SCNC: 101 MMOL/L
CHOLEST SERPL-MCNC: 199 MG/DL
CO2 SERPL-SCNC: 25 MMOL/L
CREAT SERPL-MCNC: 1.11 MG/DL
EGFR: 61 ML/MIN/1.73M2
EOSINOPHIL # BLD AUTO: 0.06 K/UL
EOSINOPHIL NFR BLD AUTO: 1 %
ESTIMATED AVERAGE GLUCOSE: 128 MG/DL
GLUCOSE SERPL-MCNC: 88 MG/DL
HBA1C MFR BLD HPLC: 6.1 %
HCT VFR BLD CALC: 40.1 %
HDLC SERPL-MCNC: 58 MG/DL
HGB BLD-MCNC: 12.4 G/DL
IMM GRANULOCYTES NFR BLD AUTO: 0.3 %
LDLC SERPL CALC-MCNC: 114 MG/DL
LYMPHOCYTES # BLD AUTO: 2.37 K/UL
LYMPHOCYTES NFR BLD AUTO: 37.9 %
MAN DIFF?: NORMAL
MCHC RBC-ENTMCNC: 26.7 PG
MCHC RBC-ENTMCNC: 30.9 GM/DL
MCV RBC AUTO: 86.2 FL
MONOCYTES # BLD AUTO: 0.57 K/UL
MONOCYTES NFR BLD AUTO: 9.1 %
NEUTROPHILS # BLD AUTO: 3.19 K/UL
NEUTROPHILS NFR BLD AUTO: 51.1 %
NONHDLC SERPL-MCNC: 141 MG/DL
PLATELET # BLD AUTO: 351 K/UL
POTASSIUM SERPL-SCNC: 5 MMOL/L
PROT SERPL-MCNC: 7.2 G/DL
RBC # BLD: 4.65 M/UL
RBC # FLD: 13.6 %
SODIUM SERPL-SCNC: 139 MMOL/L
T4 FREE SERPL-MCNC: 1.2 NG/DL
TRIGL SERPL-MCNC: 139 MG/DL
TSH SERPL-ACNC: 0.86 UIU/ML
WBC # FLD AUTO: 6.25 K/UL

## 2022-07-09 RX ORDER — CIPROFLOXACIN AND DEXAMETHASONE 3; 1 MG/ML; MG/ML
0.3-0.1 SUSPENSION/ DROPS AURICULAR (OTIC)
Qty: 1 | Refills: 0 | Status: DISCONTINUED | COMMUNITY
Start: 2021-09-24 | End: 2022-07-09

## 2022-07-09 NOTE — HISTORY OF PRESENT ILLNESS
[de-identified] : SHANICE SHAVER  is a 47 year old  female  with history of HTN, constipation, preDM, vit D deficiency presented today for comprehensive evaluation. 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 Working at [employer], and study day time. Feeling lots of stress for studying in COVID pandemic era. She felt chest pain 3 days ago and took 2 baby aspirin. The chest pressure stopped in 14min. No dizziness, palpation, diaphoresis happened.She felt CP again yesterday in a few min. The pain had no pattern nor any relation with physical exertion. Family hx: Her father had heart attack and used a pace maker and 4 Uncles had heart attack. Denies CP, palpitation, diaphoresis, SOB, dizziness at this time. She wishes to get ECG today. \par \par Self off amlodipine 2 month as she's eating less.She tried intermittent fasting. Takes only one meal a day at 12 noon-4pm. Lost body weight 20 lbs then regained 18 lbs. Taking 4 Prunes in the morning and it greatly resolved constipation. Denied fever, chills,CP, SOB, abdominal pain, n/v/c/d. [FreeTextEntry1] : annual

## 2022-07-09 NOTE — HEALTH RISK ASSESSMENT
[Good] : ~his/her~  mood as  good [Never (0 pts)] : Never (0 points) [Never] : Never [No] : In the past 12 months have you used drugs other than those required for medical reasons? No [No falls in past year] : Patient reported no falls in the past year [0] : 2) Feeling down, depressed, or hopeless: Not at all (0) [PHQ-2 Negative - No further assessment needed] : PHQ-2 Negative - No further assessment needed [None] : None [With Family] : lives with family [Employed] : employed [Sexually Active] : sexually active [Feels Safe at Home] : Feels safe at home [Fully functional (bathing, dressing, toileting, transferring, walking, feeding)] : Fully functional (bathing, dressing, toileting, transferring, walking, feeding) [Fully functional (using the telephone, shopping, preparing meals, housekeeping, doing laundry, using] : Fully functional and needs no help or supervision to perform IADLs (using the telephone, shopping, preparing meals, housekeeping, doing laundry, using transportation, managing medications and managing finances) [Smoke Detector] : smoke detector [Carbon Monoxide Detector] : carbon monoxide detector [Seat Belt] :  uses seat belt [Sunscreen] : uses sunscreen [] :  [Patient/Caregiver not ready to engage] : , patient/caregiver not ready to engage [de-identified] : barely has time for exercise [de-identified] : one meal a day [NIA2Ojjhp] : 0 [Change in mental status noted] : No change in mental status noted [Language] : denies difficulty with language [High Risk Behavior] : no high risk behavior [Reports changes in hearing] : Reports no changes in hearing [Reports changes in vision] : Reports no changes in vision [Reports changes in dental health] : Reports no changes in dental health [MammogramComments] : utd [PapSmearComments] : utd [AdvancecareDate] : 07/22

## 2022-07-09 NOTE — ASSESSMENT
[FreeTextEntry1] : SHANICE SHAVER  is a 47 year old  female  with history of HTN, constipation, preDM, vit D deficiency presented today for comprehensive evaluation.\par \par # Health maintenance\par -Pap smear: 7/2019 MRNA alpha result: not detected f/u with Gynecology.\par -Mammogram:  8/2021 [place] 1. Rx given today.\par -Colonoscopy:  Referral made for the first time surveillance colonoscopy\par -Tdap:12/2016  utd\par -COVID vaccine: Fully vaccinated and got 1 booster with all Moderna, last dose in Nov. 2021\par \par # HTN/ atypical chest pain\par Pt  self stopped amlodipine 2.5mg po qd 2 months ago after lost significant body weight but regained pounds.\par BP is 134/82.\par -ECG today: NSR\par Lifestyle modification including regular diet and low salt diet, stress reduction.\par check CBC, CMP today.\par Referred to cardiology for atypical chest pain and strong family hx of CV disease. \par \par # constipation\par High fiber diet such as prune.\par \par f/u lab\par RTC in 6 month for BP or prn.

## 2022-07-09 NOTE — PHYSICAL EXAM
[No Acute Distress] : no acute distress [Well Nourished] : well nourished [Well Developed] : well developed [Well-Appearing] : well-appearing [Normal Sclera/Conjunctiva] : normal sclera/conjunctiva [PERRL] : pupils equal round and reactive to light [EOMI] : extraocular movements intact [Normal Outer Ear/Nose] : the outer ears and nose were normal in appearance [Normal Oropharynx] : the oropharynx was normal [No Lymphadenopathy] : no lymphadenopathy [No JVD] : no jugular venous distention [Supple] : supple [Thyroid Normal, No Nodules] : the thyroid was normal and there were no nodules present [No Respiratory Distress] : no respiratory distress  [No Accessory Muscle Use] : no accessory muscle use [Normal Rate] : normal rate  [Clear to Auscultation] : lungs were clear to auscultation bilaterally [Regular Rhythm] : with a regular rhythm [Normal S1, S2] : normal S1 and S2 [No Murmur] : no murmur heard [No Carotid Bruits] : no carotid bruits [No Abdominal Bruit] : a ~M bruit was not heard ~T in the abdomen [Pedal Pulses Present] : the pedal pulses are present [No Varicosities] : no varicosities [No Edema] : there was no peripheral edema [No Palpable Aorta] : no palpable aorta [No Extremity Clubbing/Cyanosis] : no extremity clubbing/cyanosis [Normal Appearance] : normal in appearance [No Axillary Lymphadenopathy] : no axillary lymphadenopathy [No Nipple Discharge] : no nipple discharge [Soft] : abdomen soft [Non Tender] : non-tender [Non-distended] : non-distended [No Masses] : no abdominal mass palpated [Normal Bowel Sounds] : normal bowel sounds [No HSM] : no HSM [Normal Posterior Cervical Nodes] : no posterior cervical lymphadenopathy [Normal Anterior Cervical Nodes] : no anterior cervical lymphadenopathy [No CVA Tenderness] : no CVA  tenderness [No Spinal Tenderness] : no spinal tenderness [Grossly Normal Strength/Tone] : grossly normal strength/tone [No Joint Swelling] : no joint swelling [No Rash] : no rash [Coordination Grossly Intact] : coordination grossly intact [No Focal Deficits] : no focal deficits [Normal Gait] : normal gait [Deep Tendon Reflexes (DTR)] : deep tendon reflexes were 2+ and symmetric [Normal Affect] : the affect was normal [Normal Insight/Judgement] : insight and judgment were intact

## 2022-07-11 ENCOUNTER — NON-APPOINTMENT (OUTPATIENT)
Age: 47
End: 2022-07-11

## 2022-07-14 ENCOUNTER — NON-APPOINTMENT (OUTPATIENT)
Age: 47
End: 2022-07-14

## 2022-07-14 ENCOUNTER — APPOINTMENT (OUTPATIENT)
Dept: INTERNAL MEDICINE | Facility: CLINIC | Age: 47
End: 2022-07-14

## 2022-07-14 DIAGNOSIS — F41.9 ANXIETY DISORDER, UNSPECIFIED: ICD-10-CM

## 2022-07-14 PROCEDURE — 99212 OFFICE O/P EST SF 10 MIN: CPT | Mod: 95

## 2022-07-14 NOTE — ASSESSMENT
[FreeTextEntry1] : \par SHANICE SHAVER is a 47 year old  female with history of HTN, constipation, preDM, vit D deficiency presented today for dizziness and headache. \par \par # anxiety\par She is not open to any medication treatment for Anxiety as she should can't be drowsy day and night. \par Continue current counseling with her therapist and take some time off for break.\par Emailed MD letter for job excuse for today and Monday.\par \par She wants a MD letter for job accommodation request. She says she  needs to sit outside with open/bigger space to perform her job.  \par She will call Dr. Castro next week after talk with [place] for detailed accommodation request.

## 2022-07-14 NOTE — HISTORY OF PRESENT ILLNESS
[Home] : at home, [unfilled] , at the time of the visit. [Medical Office: ([facility])___] : at the medical office located in  [Verbal consent obtained from patient] : the patient, [unfilled] [FreeTextEntry8] : SHANICE SHAVER is a 47 year old  female with history of HTN, constipation, preDM, vit D deficiency presented today for dizziness and headache. Seen by me last week for CPE. Her lab was unremarkable. Pt made appointment with Dr. Cortes on 7/18 for cardiac evaluation. 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 She used to work on the street for  [employer] but since COVID pandemic she was transferred to current position that dose not allow her get out of the room. She has 1 and a half hour break during 12 hour shift. She often times needed to get up and walk out the  room to walk. Ambulation eased her anxious mood. She requested her manager to transfer to different position in [employer] but was told no due to shortage. She is actively looking for career change and that's major drive for her study at day and work at night. \par \par She did not tell me before about her therapist. Since April this year she has been seeing a therapist over the phone weekly and recently decreased the talk q 2 weeks with great rapport. Denies SI/HI.

## 2022-07-14 NOTE — PHYSICAL EXAM
[de-identified] : TEB. General: Well-developed well-nourished in no apparent distress. Appears mildly ill. \par Respiratory: Speaking in complete sentences, no respiratory distress noted.\par Neuro: No focal deficits noted.

## 2022-07-18 ENCOUNTER — APPOINTMENT (OUTPATIENT)
Dept: CARDIOLOGY | Facility: CLINIC | Age: 47
End: 2022-07-18

## 2022-07-18 ENCOUNTER — NON-APPOINTMENT (OUTPATIENT)
Age: 47
End: 2022-07-18

## 2022-07-18 VITALS
HEIGHT: 59 IN | SYSTOLIC BLOOD PRESSURE: 154 MMHG | BODY MASS INDEX: 31.25 KG/M2 | DIASTOLIC BLOOD PRESSURE: 83 MMHG | WEIGHT: 155 LBS | OXYGEN SATURATION: 100 % | HEART RATE: 76 BPM

## 2022-07-18 DIAGNOSIS — R07.89 OTHER CHEST PAIN: ICD-10-CM

## 2022-07-18 PROCEDURE — 93000 ELECTROCARDIOGRAM COMPLETE: CPT

## 2022-07-18 PROCEDURE — 99204 OFFICE O/P NEW MOD 45 MIN: CPT | Mod: 25

## 2022-07-18 NOTE — CARDIOLOGY SUMMARY
[de-identified] : 7/18/2022, sinus 76 bpm, possible left atrial enlargement [de-identified] : 7/16/2014, normal LA, normal pulmonary pressures, normal LV systolic function, LVEF 65%

## 2022-07-18 NOTE — HISTORY OF PRESENT ILLNESS
[FreeTextEntry1] : Patient is a 47 year-old Black woman with known past medical history of hypertension, prediabetes, and obesity, with a family history of coronary artery disease, who presents today because of an episode of chest pain and several weeks of dysequilibrium. 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 It resolved after her shift ended (a night shift) and she took ASA in the morning. Subsequently, she has been having dizziness. The dizziness is described as nonvertiginous light headedness, but she does not feel like she is going to pass out. It is a dysequilibrium. \par Patient walks for exercise. She walks 1-2 miles per day. She has not gotten the chest discomfort while walking. \par \par She notes recent weight gain, but her waist has gone down by two sizes. 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

## 2022-07-18 NOTE — DISCUSSION/SUMMARY
[EKG obtained to assist in diagnosis and management of assessed problem(s)] : EKG obtained to assist in diagnosis and management of assessed problem(s) [FreeTextEntry1] : \par Echocardiogram to evaluate for structural heart disease or cardiomyopathy.\par Stress echo to evaluate for inducible ischemia or arrhythmia.\par If stress echo is abnormal, or patient is seen to have ongoing symptoms, will consider CTCA to visualize coronary arteries.

## 2022-07-27 NOTE — ASU DISCHARGE PLAN (ADULT/PEDIATRIC) - FOR NEXT 24 HOURS DO NOT:
07/27/22 1429   Signing Clinician's Name / Credentials   Signing clinician's name / credentials Lyric Serrano OTR/L   Quick Adds   Rehab Discipline OT   Functional Transfer Training   Symptoms Noted During/After Treatment fatigue   Walk-In Shower Transfer Training   Symptoms Noted During/After Treatment (Walk-In Shower Transfer Training) fatigue   Walk-In Shower Transfer [place] Level (Training) stand-by assist   Walk-In Shower Transfer Physical Assistance Level (Training) supervision   Gait Training   [place] Level (Gait Training) stand-by assist   Physical Assistance Level (Gait Training) supervision   Assistive Device (Gait Training) rolling walker   Grooming Training   [place] Level (Grooming Training) stand-by assist   Assistance (Grooming Training) supervision;set-up required   Bathing Training   [place] Level (Bathing Training) stand-by assist   Assistance (Bathing Training) supervision   Assistive Device (Bathing Training) tub seat with back   Treatment Detail pt completed full shower seated and standing briefly for william care with SBA overall   Upper Body Dressing Training   [place] Level (Upper Body Dressing Training) stand-by assist   Assistance (Upper Body Dressing Training) supervision   Lower Body Dressing Training   Lower Body Dressing Training Assistance minimum assist (75% patient effort)   Lower Body Dressing Training Assistance 1 person assist   OT Discharge Planning   OT Discharge Recommendation (DC Rec) home with home care occupational therapy   OT Rationale for DC Rec Pt would benefit from home care therapies to maximize level of independence needed to be at home independently   OT Brief overview of current status Pt very pleasant today and motivated to take a shower, pt ambulated from recliner to shower with FWW and SBA/CGA, completed full shower seated and briefly standing with supervision, needed min assist with L/B dressing but SBA with all upper body cares and  Statement Selected grooming   Additional Documentation   OT Plan d/c home today with home care and assist as needed from friends   Total Session Time   Total Session Time (minutes) 45 minutes

## 2022-08-02 ENCOUNTER — APPOINTMENT (OUTPATIENT)
Dept: INTERNAL MEDICINE | Facility: CLINIC | Age: 47
End: 2022-08-02

## 2022-08-02 VITALS
OXYGEN SATURATION: 99 % | HEIGHT: 59 IN | SYSTOLIC BLOOD PRESSURE: 122 MMHG | DIASTOLIC BLOOD PRESSURE: 90 MMHG | WEIGHT: 154 LBS | BODY MASS INDEX: 31.04 KG/M2 | HEART RATE: 87 BPM

## 2022-08-02 VITALS — SYSTOLIC BLOOD PRESSURE: 130 MMHG | DIASTOLIC BLOOD PRESSURE: 85 MMHG

## 2022-08-02 PROCEDURE — 99213 OFFICE O/P EST LOW 20 MIN: CPT

## 2022-08-04 ENCOUNTER — APPOINTMENT (OUTPATIENT)
Dept: OBGYN | Facility: CLINIC | Age: 47
End: 2022-08-04

## 2022-08-04 VITALS — DIASTOLIC BLOOD PRESSURE: 82 MMHG | SYSTOLIC BLOOD PRESSURE: 128 MMHG

## 2022-08-04 DIAGNOSIS — Z91.89 OTHER SPECIFIED PERSONAL RISK FACTORS, NOT ELSEWHERE CLASSIFIED: ICD-10-CM

## 2022-08-04 DIAGNOSIS — Z01.419 ENCOUNTER FOR GYNECOLOGICAL EXAMINATION (GENERAL) (ROUTINE) W/OUT ABNORMAL FINDINGS: ICD-10-CM

## 2022-08-04 PROCEDURE — 99396 PREV VISIT EST AGE 40-64: CPT

## 2022-08-04 NOTE — PHYSICAL EXAM
[Chaperone Declined] : Patient declined chaperone [Appropriately responsive] : appropriately responsive [Alert] : alert [No Acute Distress] : no acute distress [No Lymphadenopathy] : no lymphadenopathy [Soft] : soft [Non-tender] : non-tender [Non-distended] : non-distended [No HSM] : No HSM [No Lesions] : no lesions [No Mass] : no mass [Oriented x3] : oriented x3 [Examination Of The Breasts] : a normal appearance [No Masses] : no breast masses were palpable [Labia Majora] : normal [Labia Minora] : normal [Normal] : normal [Absent] : absent [Uterine Adnexae] : normal

## 2022-08-05 LAB — HPV HIGH+LOW RISK DNA PNL CVX: NOT DETECTED

## 2022-08-07 NOTE — HISTORY OF PRESENT ILLNESS
[Home] : at home, [unfilled] , at the time of the visit. [Medical Office: ([facility])___] : at the medical office located in  [Verbal consent obtained from patient] : the patient, [unfilled] [FreeTextEntry1] : f/u  [de-identified] : SHANICE SHAVER  is a 47 year old  female  with history of HTN, constipation, preDM, vit D deficiency, obesity, anxiety presented today for aggravated allergic rhinitis. 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  Been using Zyrtec not Benadryl. \par She needs MD note to excuse work on 7/28/22. Denied fever, chills,CP, SOB, abdominal pain, n/v/c/d.

## 2022-08-07 NOTE — ASSESSMENT
[FreeTextEntry1] : SHANICE SHAVER  is a 47 year old  female  with history of HTN, constipation, preDM, vit D deficiency, obesity anxiety presented today for allergy rhinitis\par \par # CV\par No CP, palpitation, or SOB at this time.\par Appreciated note by Dr. Cortes, cardiology on 7/18/22. \par Pt made appointment for TTE on 8/18 and stress ECHO on 8/31.\par \par # allergic rhinitis\par Recommended to slow down work load. MD note for work offered.\par Try nasal saline spray, steaming, regularly use Flonase 2 puffs to each nose qd for 7days.\par \par RTO in 6 month with Dr. Castro.

## 2022-08-07 NOTE — PHYSICAL EXAM
[Normal Outer Ear/Nose] : the outer ears and nose were normal in appearance [Normal Oropharynx] : the oropharynx was normal [Normal TMs] : both tympanic membranes were normal [de-identified] : WDWN in NAD\par HEENT:  unremarkable\par Neck:  supple, no JVD, no LN\par Lungs:  CTA B/L, no W/R/R\par Heart:  Reg rate, +S1S2, no M/R/G\par Abdomen:  soft, NT, ND, +BS, no masses, no HS-megaly\par Genital: No pubic or genital lesions noted.\par Ext:  no C/C/E\par Neuro:  no focal deficits [de-identified] : boggy nasal membrane

## 2022-08-09 LAB — CYTOLOGY CVX/VAG DOC THIN PREP: NORMAL

## 2022-08-18 ENCOUNTER — APPOINTMENT (OUTPATIENT)
Dept: CARDIOLOGY | Facility: CLINIC | Age: 47
End: 2022-08-18

## 2022-08-18 PROCEDURE — 93306 TTE W/DOPPLER COMPLETE: CPT

## 2022-08-24 ENCOUNTER — APPOINTMENT (OUTPATIENT)
Dept: INTERNAL MEDICINE | Facility: CLINIC | Age: 47
End: 2022-08-24

## 2022-08-24 DIAGNOSIS — Z56.6 OTHER PHYSICAL AND MENTAL STRAIN RELATED TO WORK: ICD-10-CM

## 2022-08-24 DIAGNOSIS — R53.83 OTHER FATIGUE: ICD-10-CM

## 2022-08-24 PROCEDURE — 99442: CPT

## 2022-08-24 SDOH — HEALTH STABILITY - MENTAL HEALTH: OTHER PHYSICAL AND MENTAL STRAIN RELATED TO WORK: Z56.6

## 2022-08-24 NOTE — PHYSICAL EXAM
[de-identified] : TEB. General: Well-developed well-nourished in no apparent distress. Appears mildly ill. \par Respiratory: Speaking in complete sentences, no respiratory distress noted.\par Neuro: No focal deficits noted.\par

## 2022-08-24 NOTE — ASSESSMENT
[FreeTextEntry1] : SHANICE SHAVER  is a 47 year old female  with history of  HTN, constipation, preDM, vit D deficiency presented today for dizziness spell with fatigue.\par \par # allergy symptoms and fatigue\par Try OTC anti histamine eye drop and lubricant.\par Continue supportive care and take rest. \par Continue supportive care including rest, enough oral hydration. \par MD note sent via her email. \par \par # Dizziness, headache\par Denied CP, SOB. \par Update eye glass and see if her headache gets better. \par Flu with cardiology.\par \par RTO as needed.

## 2022-08-24 NOTE — HISTORY OF PRESENT ILLNESS
[FreeTextEntry8] : SHANICE SHAVER  is a 47 year old female  with history of  HTN, constipation, preDM, vit D deficiency presented today for dizziness spell with fatigue. 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 She reported tearing itchy eyes, headache, sinus congestion due to seasonal allergy. She need MD letter for job excuse from 8/21 to 8/25. May RTW on 8/29/22. She has recently requested multiple times of MD note for job excuse. She thinks her vision decreased and need to see her ophthalmologist to update eyeglass Rx.  \par \par Reviewed note by Dr. Burgos, Gyn on 8/4/22: to check Breast MR. She checked ECHO on 8/18 and will see Dr. Guillaume on 8/31. Lab on 7/9/22: normal CBC, CMP, HgbA1C 6.1%. Today's Home BP was 158/95 but she did not take Amlodipine 2.5mg po qd yesterday. Denied fever, chills,CP, SOB, abdominal pain, n/v/c/d.

## 2022-08-31 ENCOUNTER — APPOINTMENT (OUTPATIENT)
Dept: CARDIOLOGY | Facility: CLINIC | Age: 47
End: 2022-08-31

## 2022-08-31 PROCEDURE — 93351 STRESS TTE COMPLETE: CPT

## 2022-09-23 ENCOUNTER — NON-APPOINTMENT (OUTPATIENT)
Age: 47
End: 2022-09-23

## 2022-10-10 ENCOUNTER — APPOINTMENT (OUTPATIENT)
Dept: CARDIOLOGY | Facility: CLINIC | Age: 47
End: 2022-10-10

## 2022-10-13 PROBLEM — Z13.31 SCREENING FOR DEPRESSION: Status: ACTIVE | Noted: 2022-07-09

## 2022-10-14 ENCOUNTER — APPOINTMENT (OUTPATIENT)
Dept: MAMMOGRAPHY | Facility: IMAGING CENTER | Age: 47
End: 2022-10-14

## 2022-10-14 ENCOUNTER — APPOINTMENT (OUTPATIENT)
Dept: MRI IMAGING | Facility: IMAGING CENTER | Age: 47
End: 2022-10-14

## 2022-10-14 ENCOUNTER — OUTPATIENT (OUTPATIENT)
Dept: OUTPATIENT SERVICES | Facility: HOSPITAL | Age: 47
LOS: 1 days | End: 2022-10-14
Payer: COMMERCIAL

## 2022-10-14 ENCOUNTER — RESULT REVIEW (OUTPATIENT)
Age: 47
End: 2022-10-14

## 2022-10-14 DIAGNOSIS — Z91.89 OTHER SPECIFIED PERSONAL RISK FACTORS, NOT ELSEWHERE CLASSIFIED: ICD-10-CM

## 2022-10-14 DIAGNOSIS — S91.203A UNSPECIFIED OPEN WOUND OF UNSPECIFIED GREAT TOE WITH DAMAGE TO NAIL, INITIAL ENCOUNTER: Chronic | ICD-10-CM

## 2022-10-14 DIAGNOSIS — Z98.891 HISTORY OF UTERINE SCAR FROM PREVIOUS SURGERY: Chronic | ICD-10-CM

## 2022-10-14 DIAGNOSIS — Z90.710 ACQUIRED ABSENCE OF BOTH CERVIX AND UTERUS: Chronic | ICD-10-CM

## 2022-10-14 PROCEDURE — 77049 MRI BREAST C-+ W/CAD BI: CPT | Mod: 26

## 2022-10-14 PROCEDURE — 77067 SCR MAMMO BI INCL CAD: CPT | Mod: 26

## 2022-10-14 PROCEDURE — 77063 BREAST TOMOSYNTHESIS BI: CPT

## 2022-10-14 PROCEDURE — 77067 SCR MAMMO BI INCL CAD: CPT

## 2022-10-14 PROCEDURE — 77063 BREAST TOMOSYNTHESIS BI: CPT | Mod: 26

## 2022-10-14 PROCEDURE — A9585: CPT

## 2022-10-14 PROCEDURE — C8908: CPT

## 2022-10-14 PROCEDURE — C8937: CPT

## 2022-10-17 ENCOUNTER — RESULT REVIEW (OUTPATIENT)
Age: 47
End: 2022-10-17

## 2022-10-17 DIAGNOSIS — R92.2 INCONCLUSIVE MAMMOGRAM: ICD-10-CM

## 2022-11-02 ENCOUNTER — APPOINTMENT (OUTPATIENT)
Dept: ULTRASOUND IMAGING | Facility: IMAGING CENTER | Age: 47
End: 2022-11-02

## 2022-11-02 ENCOUNTER — APPOINTMENT (OUTPATIENT)
Dept: MAMMOGRAPHY | Facility: IMAGING CENTER | Age: 47
End: 2022-11-02

## 2022-11-18 ENCOUNTER — APPOINTMENT (OUTPATIENT)
Dept: MAMMOGRAPHY | Facility: IMAGING CENTER | Age: 47
End: 2022-11-18

## 2022-11-18 ENCOUNTER — RESULT REVIEW (OUTPATIENT)
Age: 47
End: 2022-11-18

## 2022-11-18 ENCOUNTER — OUTPATIENT (OUTPATIENT)
Dept: OUTPATIENT SERVICES | Facility: HOSPITAL | Age: 47
LOS: 1 days | End: 2022-11-18
Payer: COMMERCIAL

## 2022-11-18 ENCOUNTER — APPOINTMENT (OUTPATIENT)
Dept: ULTRASOUND IMAGING | Facility: IMAGING CENTER | Age: 47
End: 2022-11-18

## 2022-11-18 DIAGNOSIS — Z00.8 ENCOUNTER FOR OTHER GENERAL EXAMINATION: ICD-10-CM

## 2022-11-18 DIAGNOSIS — Z90.710 ACQUIRED ABSENCE OF BOTH CERVIX AND UTERUS: Chronic | ICD-10-CM

## 2022-11-18 DIAGNOSIS — R92.2 INCONCLUSIVE MAMMOGRAM: ICD-10-CM

## 2022-11-18 DIAGNOSIS — Z98.891 HISTORY OF UTERINE SCAR FROM PREVIOUS SURGERY: Chronic | ICD-10-CM

## 2022-11-18 DIAGNOSIS — S91.203A UNSPECIFIED OPEN WOUND OF UNSPECIFIED GREAT TOE WITH DAMAGE TO NAIL, INITIAL ENCOUNTER: Chronic | ICD-10-CM

## 2022-11-18 PROCEDURE — 77065 DX MAMMO INCL CAD UNI: CPT | Mod: 26,RT

## 2022-11-18 PROCEDURE — 76642 ULTRASOUND BREAST LIMITED: CPT | Mod: 26,RT

## 2022-11-18 PROCEDURE — G0279: CPT | Mod: 26

## 2022-11-18 PROCEDURE — G0279: CPT

## 2022-11-18 PROCEDURE — 77065 DX MAMMO INCL CAD UNI: CPT

## 2022-11-18 PROCEDURE — 76642 ULTRASOUND BREAST LIMITED: CPT

## 2022-11-30 ENCOUNTER — APPOINTMENT (OUTPATIENT)
Dept: INTERNAL MEDICINE | Facility: CLINIC | Age: 47
End: 2022-11-30

## 2022-11-30 VITALS
HEART RATE: 67 BPM | SYSTOLIC BLOOD PRESSURE: 128 MMHG | WEIGHT: 155 LBS | OXYGEN SATURATION: 98 % | HEIGHT: 59 IN | DIASTOLIC BLOOD PRESSURE: 88 MMHG | BODY MASS INDEX: 31.25 KG/M2

## 2022-11-30 DIAGNOSIS — R42 DIZZINESS AND GIDDINESS: ICD-10-CM

## 2022-11-30 DIAGNOSIS — R92.2 INCONCLUSIVE MAMMOGRAM: ICD-10-CM

## 2022-11-30 PROCEDURE — 99213 OFFICE O/P EST LOW 20 MIN: CPT

## 2022-11-30 NOTE — ASSESSMENT
[FreeTextEntry1] : SHANICE SHAVER is a 47 year old female with history of HTN, constipation, preDM, vit D deficiency presented today for dizziness spell with fatigue for a few days.\par \par # dizziness and elevated BP\par Denied palpitation, CP, LE swelling or SOB\par Seen by Dr. Guillaume, cardiology on 8/31/22: normal stress test w/ MET11, normal ECHO.\par Lab on 7/9/22 CBC, CMP normal, HgbA1C 6.1\par BP is above target and pt has strong CV family hx.\par Increased Amlodipine to 5mg po qd from 2.5mg qd. Rx sent. \par Continue current management including low salt diet and regular exercise.\par Encouraged stress management and enough oral fluid intake.\par Patient was advised to call us for any worsening sx or if no resolution. \par \par # abnormal breast imaging.\par Will inform Dr. Castro on pt's request.\par \par RTO in 3 month with BP log and BP cuff.

## 2022-11-30 NOTE — PHYSICAL EXAM
[de-identified] : WDWN in NAD\par HEENT:  unremarkable\par Neck:  supple, no JVD, no LN\par Lungs:  CTA B/L, no W/R/R\par Heart:  Reg rate, +S1S2, no M/R/G\par Abdomen:  soft, NT, ND, +BS, no masses, no HS-megaly\par Genital: No pubic or genital lesions noted.\par Ext:  no C/C/E\par Neuro:  no focal deficits

## 2022-11-30 NOTE — REVIEW OF SYSTEMS
[FreeTextEntry2] : Constitutional:  no fever and no chills. \par Eyes:  no discharge. \par HEENT:  no earache. \par Cardiovascular:  no chest pain, no palpitations and no lower extremity edema. \par Respiratory:  no shortness of breath, no wheezing and no cough. \par Gastrointestinal:  no abdominal pain, no nausea and no vomiting. \par Genitourinary:  no dysuria. \par Musculoskeletal:  no joint pain. \par Integumentary:  no itching. \par Neurological:  no headache. \par Psychiatric:  not suicidal. \par Hematologic/Lymphatic:  no easy bleeding. [FreeTextEntry5] : see hpi [de-identified] : see hpi

## 2022-11-30 NOTE — HISTORY OF PRESENT ILLNESS
[FreeTextEntry8] : SHANICE SHAVER is a 47 year old female with history of HTN, constipation, preDM, vit D deficiency presented today for dizziness spell with fatigue for a few days. 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 \par The dizziness was not related with position change or physical exertion.  \par She thought she might have dehydrated at that time. \par Denied CP, SOB, vision change, syncope.\par Reportedly she took Amlodipine 2.5mg po qd regularly and her BP cuff was synchronized with her smart phone. She showed me her BP log which fluctuated from 136//82 to 164/110.\par She needs MD letter for job.\par \par Reviewed breast US and mammo in Nov 2022: multiple cyst noted. BIRADS 2. Recommended to repeat MR breast in 6 months. Pt wishes for Dr. Castro to call her for further review on her breast test.\par Denied fever, chills,CP, SOB, abdominal pain, n/v/c/d.

## 2022-12-21 ENCOUNTER — APPOINTMENT (OUTPATIENT)
Dept: OBGYN | Facility: CLINIC | Age: 47
End: 2022-12-21

## 2022-12-29 NOTE — ED PROVIDER NOTE - PSH
section  '   S/P tooth extraction  '     [place] teeth X2
at baseline/dependent (less than 25% patients effort)

## 2023-02-10 ENCOUNTER — APPOINTMENT (OUTPATIENT)
Dept: INTERNAL MEDICINE | Facility: CLINIC | Age: 48
End: 2023-02-10
Payer: COMMERCIAL

## 2023-02-10 DIAGNOSIS — R07.0 PAIN IN THROAT: ICD-10-CM

## 2023-02-10 DIAGNOSIS — R05.9 COUGH, UNSPECIFIED: ICD-10-CM

## 2023-02-10 PROCEDURE — 99214 OFFICE O/P EST MOD 30 MIN: CPT | Mod: 95

## 2023-02-11 ENCOUNTER — TRANSCRIPTION ENCOUNTER (OUTPATIENT)
Age: 48
End: 2023-02-11

## 2023-02-12 ENCOUNTER — NON-APPOINTMENT (OUTPATIENT)
Age: 48
End: 2023-02-12

## 2023-02-12 ENCOUNTER — EMERGENCY (EMERGENCY)
Facility: HOSPITAL | Age: 48
LOS: 1 days | Discharge: HOME | End: 2023-02-12
Attending: STUDENT IN AN ORGANIZED HEALTH CARE EDUCATION/TRAINING PROGRAM
Payer: COMMERCIAL

## 2023-02-12 VITALS
TEMPERATURE: 98 F | HEART RATE: 68 BPM | RESPIRATION RATE: 19 BRPM | OXYGEN SATURATION: 100 % | SYSTOLIC BLOOD PRESSURE: 130 MMHG | DIASTOLIC BLOOD PRESSURE: 93 MMHG

## 2023-02-12 VITALS
HEART RATE: 81 BPM | HEIGHT: 59 IN | SYSTOLIC BLOOD PRESSURE: 175 MMHG | OXYGEN SATURATION: 100 % | RESPIRATION RATE: 20 BRPM | DIASTOLIC BLOOD PRESSURE: 95 MMHG | WEIGHT: 147.93 LBS | TEMPERATURE: 98 F

## 2023-02-12 DIAGNOSIS — U07.1 COVID-19: ICD-10-CM

## 2023-02-12 DIAGNOSIS — S91.203A UNSPECIFIED OPEN WOUND OF UNSPECIFIED GREAT TOE WITH DAMAGE TO NAIL, INITIAL ENCOUNTER: Chronic | ICD-10-CM

## 2023-02-12 DIAGNOSIS — Z90.710 ACQUIRED ABSENCE OF BOTH CERVIX AND UTERUS: Chronic | ICD-10-CM

## 2023-02-12 DIAGNOSIS — Z98.891 HISTORY OF UTERINE SCAR FROM PREVIOUS SURGERY: Chronic | ICD-10-CM

## 2023-02-12 LAB
ALBUMIN SERPL ELPH-MCNC: 4.4 G/DL — SIGNIFICANT CHANGE UP (ref 3.3–5)
ALP SERPL-CCNC: 67 U/L — SIGNIFICANT CHANGE UP (ref 40–120)
ALT FLD-CCNC: 39 U/L — SIGNIFICANT CHANGE UP (ref 10–45)
ANION GAP SERPL CALC-SCNC: 9 MMOL/L — SIGNIFICANT CHANGE UP (ref 5–17)
APTT BLD: 29.8 SEC — SIGNIFICANT CHANGE UP (ref 27.5–35.5)
AST SERPL-CCNC: 25 U/L — SIGNIFICANT CHANGE UP (ref 10–40)
BASOPHILS # BLD AUTO: 0.02 K/UL — SIGNIFICANT CHANGE UP (ref 0–0.2)
BASOPHILS NFR BLD AUTO: 0.5 % — SIGNIFICANT CHANGE UP (ref 0–2)
BILIRUB SERPL-MCNC: <0.1 MG/DL — LOW (ref 0.2–1.2)
BUN SERPL-MCNC: 9 MG/DL — SIGNIFICANT CHANGE UP (ref 7–23)
CALCIUM SERPL-MCNC: 9.1 MG/DL — SIGNIFICANT CHANGE UP (ref 8.4–10.5)
CHLORIDE SERPL-SCNC: 100 MMOL/L — SIGNIFICANT CHANGE UP (ref 96–108)
CO2 SERPL-SCNC: 30 MMOL/L — SIGNIFICANT CHANGE UP (ref 22–31)
CREAT SERPL-MCNC: 0.7 MG/DL — SIGNIFICANT CHANGE UP (ref 0.5–1.3)
D DIMER BLD IA.RAPID-MCNC: <150 NG/ML DDU — SIGNIFICANT CHANGE UP
EGFR: 106 ML/MIN/1.73M2 — SIGNIFICANT CHANGE UP
EOSINOPHIL # BLD AUTO: 0.09 K/UL — SIGNIFICANT CHANGE UP (ref 0–0.5)
EOSINOPHIL NFR BLD AUTO: 2.3 % — SIGNIFICANT CHANGE UP (ref 0–6)
FLUAV AG NPH QL: SIGNIFICANT CHANGE UP
FLUBV AG NPH QL: SIGNIFICANT CHANGE UP
GLUCOSE SERPL-MCNC: 111 MG/DL — HIGH (ref 70–99)
HCG SERPL-ACNC: <2 MIU/ML — SIGNIFICANT CHANGE UP
HCT VFR BLD CALC: 41.1 % — SIGNIFICANT CHANGE UP (ref 34.5–45)
HGB BLD-MCNC: 12.9 G/DL — SIGNIFICANT CHANGE UP (ref 11.5–15.5)
IMM GRANULOCYTES NFR BLD AUTO: 0.3 % — SIGNIFICANT CHANGE UP (ref 0–0.9)
INFLUENZA A RESULT: NOT DETECTED
INFLUENZA B RESULT: NOT DETECTED
INR BLD: 0.99 RATIO — SIGNIFICANT CHANGE UP (ref 0.88–1.16)
LYMPHOCYTES # BLD AUTO: 2.33 K/UL — SIGNIFICANT CHANGE UP (ref 1–3.3)
LYMPHOCYTES # BLD AUTO: 58.8 % — HIGH (ref 13–44)
MAGNESIUM SERPL-MCNC: 2 MG/DL — SIGNIFICANT CHANGE UP (ref 1.6–2.6)
MCHC RBC-ENTMCNC: 27.1 PG — SIGNIFICANT CHANGE UP (ref 27–34)
MCHC RBC-ENTMCNC: 31.4 GM/DL — LOW (ref 32–36)
MCV RBC AUTO: 86.3 FL — SIGNIFICANT CHANGE UP (ref 80–100)
MONOCYTES # BLD AUTO: 0.41 K/UL — SIGNIFICANT CHANGE UP (ref 0–0.9)
MONOCYTES NFR BLD AUTO: 10.4 % — SIGNIFICANT CHANGE UP (ref 2–14)
NEUTROPHILS # BLD AUTO: 1.1 K/UL — LOW (ref 1.8–7.4)
NEUTROPHILS NFR BLD AUTO: 27.7 % — LOW (ref 43–77)
NRBC # BLD: 0 /100 WBCS — SIGNIFICANT CHANGE UP (ref 0–0)
NT-PROBNP SERPL-SCNC: <36 PG/ML — SIGNIFICANT CHANGE UP (ref 0–300)
PLATELET # BLD AUTO: 307 K/UL — SIGNIFICANT CHANGE UP (ref 150–400)
POTASSIUM SERPL-MCNC: 3.9 MMOL/L — SIGNIFICANT CHANGE UP (ref 3.5–5.3)
POTASSIUM SERPL-SCNC: 3.9 MMOL/L — SIGNIFICANT CHANGE UP (ref 3.5–5.3)
PROT SERPL-MCNC: 7.6 G/DL — SIGNIFICANT CHANGE UP (ref 6–8.3)
PROTHROM AB SERPL-ACNC: 11.5 SEC — SIGNIFICANT CHANGE UP (ref 10.5–13.4)
RBC # BLD: 4.76 M/UL — SIGNIFICANT CHANGE UP (ref 3.8–5.2)
RBC # FLD: 13.5 % — SIGNIFICANT CHANGE UP (ref 10.3–14.5)
RESP SYN VIRUS RESULT: NOT DETECTED
RSV RNA NPH QL NAA+NON-PROBE: SIGNIFICANT CHANGE UP
SARS-COV-2 RESULT: DETECTED
SARS-COV-2 RNA SPEC QL NAA+PROBE: DETECTED
SODIUM SERPL-SCNC: 139 MMOL/L — SIGNIFICANT CHANGE UP (ref 135–145)
TROPONIN T, HIGH SENSITIVITY RESULT: <6 NG/L — SIGNIFICANT CHANGE UP (ref 0–51)
WBC # BLD: 3.96 K/UL — SIGNIFICANT CHANGE UP (ref 3.8–10.5)
WBC # FLD AUTO: 3.96 K/UL — SIGNIFICANT CHANGE UP (ref 3.8–10.5)

## 2023-02-12 PROCEDURE — 85018 HEMOGLOBIN: CPT

## 2023-02-12 PROCEDURE — 82803 BLOOD GASES ANY COMBINATION: CPT

## 2023-02-12 PROCEDURE — 82947 ASSAY GLUCOSE BLOOD QUANT: CPT

## 2023-02-12 PROCEDURE — 85025 COMPLETE CBC W/AUTO DIFF WBC: CPT

## 2023-02-12 PROCEDURE — 85610 PROTHROMBIN TIME: CPT

## 2023-02-12 PROCEDURE — 71045 X-RAY EXAM CHEST 1 VIEW: CPT | Mod: 26

## 2023-02-12 PROCEDURE — 93005 ELECTROCARDIOGRAM TRACING: CPT

## 2023-02-12 PROCEDURE — 84484 ASSAY OF TROPONIN QUANT: CPT

## 2023-02-12 PROCEDURE — 84702 CHORIONIC GONADOTROPIN TEST: CPT

## 2023-02-12 PROCEDURE — 96374 THER/PROPH/DIAG INJ IV PUSH: CPT

## 2023-02-12 PROCEDURE — 82330 ASSAY OF CALCIUM: CPT

## 2023-02-12 PROCEDURE — 82435 ASSAY OF BLOOD CHLORIDE: CPT

## 2023-02-12 PROCEDURE — 87637 SARSCOV2&INF A&B&RSV AMP PRB: CPT

## 2023-02-12 PROCEDURE — 84295 ASSAY OF SERUM SODIUM: CPT

## 2023-02-12 PROCEDURE — 83605 ASSAY OF LACTIC ACID: CPT

## 2023-02-12 PROCEDURE — 85014 HEMATOCRIT: CPT

## 2023-02-12 PROCEDURE — 84132 ASSAY OF SERUM POTASSIUM: CPT

## 2023-02-12 PROCEDURE — 85730 THROMBOPLASTIN TIME PARTIAL: CPT

## 2023-02-12 PROCEDURE — 71045 X-RAY EXAM CHEST 1 VIEW: CPT

## 2023-02-12 PROCEDURE — 99285 EMERGENCY DEPT VISIT HI MDM: CPT | Mod: 25

## 2023-02-12 PROCEDURE — 99285 EMERGENCY DEPT VISIT HI MDM: CPT

## 2023-02-12 PROCEDURE — 83735 ASSAY OF MAGNESIUM: CPT

## 2023-02-12 PROCEDURE — 80053 COMPREHEN METABOLIC PANEL: CPT

## 2023-02-12 PROCEDURE — 36415 COLL VENOUS BLD VENIPUNCTURE: CPT

## 2023-02-12 PROCEDURE — 85379 FIBRIN DEGRADATION QUANT: CPT

## 2023-02-12 PROCEDURE — 83880 ASSAY OF NATRIURETIC PEPTIDE: CPT

## 2023-02-12 RX ORDER — KETOROLAC TROMETHAMINE 30 MG/ML
15 SYRINGE (ML) INJECTION ONCE
Refills: 0 | Status: DISCONTINUED | OUTPATIENT
Start: 2023-02-12 | End: 2023-02-12

## 2023-02-12 RX ORDER — IBUPROFEN 200 MG
400 TABLET ORAL ONCE
Refills: 0 | Status: COMPLETED | OUTPATIENT
Start: 2023-02-12 | End: 2023-02-12

## 2023-02-12 RX ADMIN — Medication 15 MILLIGRAM(S): at 07:54

## 2023-02-12 NOTE — ED ADULT TRIAGE NOTE - PRO INTERPRETER NEED 2
OFFICE VISIT      Patient: Maria Teresa Mackey   : 1986 MRN: [id number]    SUBJECTIVE:  Chief Complaint   Patient presents with   • Physical   • Office Visit   • Imm/Inj     Flu vaccine       A 34 year old female is here for an annual physical examination.    HISTORY OF PRESENT ILLNESS:  Annual physical exam:  Medications: On multivitamin.   Labs: Recent labs done on November 10, 2020, showed normal WBC, hemoglobin, hematocrit and platelets, low MCV. She had irregular menstrual periods. She lost weight, and now has regular menstrual periods since 4-5 months. She has noticed constipation from iron supplement. Urinalysis showed some epithelial cells. Her lipid panel improved from last year, total cholesterol is 224 mg/dL, triglycerides is 87 mg/dL, HDL 61 mg/dL, and  mg/dL. Kidney function, blood glucose levels, liver function, and electrolytes were normal.  Diet: She mostly eats home cooked food. She is not a vegetarian.     Anxiety: She is on Fluoxetine 40 mg daily, which helps. She prefers to stay at the same dose. Takes Alprazolam as needed when she is traveling via airplane. Last was taken more than a year ago. Her father has anxiety as well.     Need for vaccination: Due for influenza vaccine.     History of Bell's palsy: She was diagnosed with Bell's palsy in 2019. She had experienced sudden numbness on the left side after waking up. She was seen in the Urgent Care, was informed it is nothing major, continue to monitor. Mentions by evening she was not able to spit. She went to the Urgent Care again and was diagnosed with left sided Bell's palsy. She was prescribed steroid for 10 days. Mentions her symptoms have improved only 90%. She is able to close her left eye, has no dry eyes. It improved gradually, she did not notice any difference in the first two months.    Additional comments:  Following precautionary measures against COVID-19.  Works from home.    PAST MEDICAL HISTORY:  Past Medical  History:   Diagnosis Date   • Abnormal Pap smear of cervix 3/25/10    LGSIL   • Anxiety    • Depression    • Disorder of bone and cartilage, unspecified 2010    Just with pregnancy, resolved after delivery     MEDICATIONS:  Current Outpatient Medications   Medication Sig   • fluoxetine (PROzac) 40 MG capsule Take 1 capsule by mouth daily.   • Multiple Vitamins-Minerals (MULTIVITAMIN GUMMIES ADULT) Chew Tab    • ALPRAZolam (XANAX) 0.25 MG tablet May take 1-2 tablets as needed up to 3 times daily for flight anxiety.     No current facility-administered medications for this visit.      ALLERGIES:  ALLERGIES:   Allergen Reactions   • Dust    • Seasonal Other (See Comments)     PAST SURGICAL HISTORY:  Past Surgical History:   Procedure Laterality Date   •  section, low transverse  10/18/11    twins   • Colposcopy bx cervix endocerv curr  4/14/10   • Cryocautery of cervix  5/26/10   • Hysteroscopy  4/9/10   • Upper gi endoscopy,exam  6/10/09     FAMILY HISTORY:  Family History   Problem Relation Age of Onset   • Kidney disease Mother         lithotripsy   • Tuberculosis Mother    • Thyroid Mother         disease   • Anxiety disorder Father    • Asthma Father    • Depression Father    • Hypertension Father    • Cancer Maternal Grandmother         multiple myeloma    p   • Diabetes Maternal Grandfather      SOCIAL HISTORY:  Social History     Tobacco Use   Smoking Status Never Smoker   Smokeless Tobacco Never Used     Social History     Substance and Sexual Activity   Alcohol Use No       Review of Systems  Constitutional: No weight changes, fever or chills.  Eyes: No changes in the vision.  ENT: No nasal problems or issues with the throat. No changes in hearing or difficulty swallowing.  Respiratory: No difficulty breathing, shortness of breath, or cough.  Cardiovascular: No chest pain.  Gastrointestinal: No heartburn, nausea, vomiting, diarrhea, constipation, hematochezia, or melena.  Genitourinary: No  urinary complaints.   Musculoskeletal: No muscle or joint pain.   Neurological: No numbness, weakness, or tingling.  Endocrine: No heat or cold intolerance, polyphagia, polyuria, or polydipsia.  Skin: No skin changes.   Psychiatry: Anxiety. No depression.  Breast: No abnormalities in the breast.    OBJECTIVE:  Vitals:    11/12/20 1500   BP: 98/66   Pulse: 68   Resp: 16   Weight: 60.1 kg (132 lb 6.4 oz)   Height: 5' 2.5\" (1.588 m)       Physical Exam  Constitutional: Wearing a mask. Alert, in no acute distress.  Eyes: No discharge, normal conjunctiva, no eyelid swelling, no ptosis and the sclerae were normal. Pupils equal, round and reactive to light and accommodation, conjugate gaze and extraocular movements were intact.  ENT:  Normal appearing outer ear, normal appearing nose. Examination of the tympanic membrane showed normal landmarks, normal appearing external canal. Nasal mucosa moist and pink, no nasal discharge. Normal lips. Oral mucosa pink and moist, no oral lesions.  Neck: Normal appearing, supple neck and no mass was seen. Thyroid not enlarged and no thyroid nodules. No lymphadenopathy.  Pulmonary: No respiratory distress, normal respiratory rate and effort and no accessory muscle use. Breath sounds clear to auscultation bilaterally.  Cardiovascular: Normal rate, no murmurs,, regular rhythm, normal S1 and normal S2. Edema was not present in the lower extremities.  Abdomen: Soft, nontender, nondistended, normal bowel sounds and no abdominal mass. No hepatomegaly and no splenomegaly. No umbilical hernia was seen.  Musculoskeletal: Normal gait. No musculoskeletal erythema was seen, no joint swelling seen and no joint tenderness was elicited. No scoliosis. Normal range of motion. There was no joint instability noted. Muscle strength and tone were normal.  Neurological: Mildly asymmetric smile and eyebrows. cranial nerves II-XII are grossly intact. Reflexes are normal. No sensory changes.   Psychiatric:  Oriented to person, oriented to place and oriented to time. Interactive and mood/affect were appropriate. Judgment not impaired. Normal attention span. Short term memory intact.  Skin, Hair, Nails: Normal skin color and pigmentation and no rash. No foot ulcers and no skin ulcer was seen. Normal skin turgor. No clubbing or cyanosis of the fingernails.  Breast: Symmetric, breast tissue normal bilaterally without masses or skin changes, no nipple discharge; no axillary lymphadenopathy.    DIAGNOSTIC STUDIES:  LAB RESULTS:  Lab Services on 11/10/2020   Component Date Value Ref Range Status   • Fasting Status 11/10/2020 12  Hours Final   • Sodium 11/10/2020 140  135 - 145 mmol/L Final   • Potassium 11/10/2020 4.1  3.4 - 5.1 mmol/L Final   • Chloride 11/10/2020 103  98 - 107 mmol/L Final   • Carbon Dioxide 11/10/2020 28  21 - 32 mmol/L Final   • Anion Gap 11/10/2020 13  10 - 20 mmol/L Final   • Glucose 11/10/2020 92  65 - 99 mg/dL Final   • BUN 11/10/2020 14  6 - 20 mg/dL Final   • Creatinine 11/10/2020 0.76  0.51 - 0.95 mg/dL Final   • Glomerular Filtration Rate 11/10/2020 >90  >90 mL/min/1.73m2 Final   • BUN/ Creatinine Ratio 11/10/2020 18  7 - 25 Final   • Calcium 11/10/2020 9.1  8.4 - 10.2 mg/dL Final   • Bilirubin, Total 11/10/2020 0.4  0.2 - 1.0 mg/dL Final   • GOT/AST 11/10/2020 13  <=37 Units/L Final   • GPT/ALT 11/10/2020 17  <64 Units/L Final   • Alkaline Phosphatase 11/10/2020 56  45 - 117 Units/L Final   • Albumin 11/10/2020 3.8  3.6 - 5.1 g/dL Final   • Protein, Total 11/10/2020 7.5  6.4 - 8.2 g/dL Final   • Globulin 11/10/2020 3.7  2.0 - 4.0 g/dL Final   • A/G Ratio 11/10/2020 1.0  1.0 - 2.4 Final   • Fasting Status 11/10/2020 12  Hours Final   • Cholesterol 11/10/2020 224* <=199 mg/dL Final   • Triglycerides 11/10/2020 87  <=149 mg/dL Final   • HDL 11/10/2020 61  >=50 mg/dL Final   • LDL 11/10/2020 146* <=129 mg/dL Final   • Non-HDL Cholesterol 11/10/2020 163  mg/dL Final   • Cholesterol/ HDL Ratio  11/10/2020 3.7  <=4.4 Final   • COLOR, URINALYSIS 11/10/2020 Yellow   Final   • APPEARANCE, URINALYSIS 11/10/2020 Clear   Final   • GLUCOSE, URINALYSIS 11/10/2020 Negative  Negative mg/dL Final   • BILIRUBIN, URINALYSIS 11/10/2020 Negative  Negative Final   • KETONES, URINALYSIS 11/10/2020 Negative  Negative mg/dL Final   • SPECIFIC GRAVITY, URINALYSIS 11/10/2020 1.025  1.005 - 1.030 Final   • OCCULT BLOOD, URINALYSIS 11/10/2020 Small* Negative Final   • PH, URINALYSIS 11/10/2020 5.5  5.0 - 7.0 Final   • PROTEIN, URINALYSIS 11/10/2020 Negative  Negative mg/dL Final   • UROBILINOGEN, URINALYSIS 11/10/2020 0.2  0.2, 1.0 mg/dL Final   • NITRITE, URINALYSIS 11/10/2020 Negative  Negative Final   • LEUKOCYTE ESTERASE, URINALYSIS 11/10/2020 Negative  Negative Final   • SQUAMOUS EPITHELIAL, URINALYSIS 11/10/2020 6 to 10* None Seen, 1 to 5 /hpf Final   • ERYTHROCYTES, URINALYSIS 11/10/2020 1 to 2  None Seen, 1 to 2 /hpf Final   • LEUKOCYTES, URINALYSIS 11/10/2020 None Seen  None Seen, 1 to 5 /hpf Final   • BACTERIA, URINALYSIS 11/10/2020 Few* None Seen /hpf Final   • HYALINE CASTS, URINALYSIS 11/10/2020 None Seen  None Seen, 1 to 5 /lpf Final   • WBC 11/10/2020 5.3  4.2 - 11.0 K/mcL Final   • RBC 11/10/2020 5.14  4.00 - 5.20 mil/mcL Final   • HGB 11/10/2020 12.6  12.0 - 15.5 g/dL Final   • HCT 11/10/2020 39.2  36.0 - 46.5 % Final   • MCV 11/10/2020 76.3* 78.0 - 100.0 fl Final   • MCH 11/10/2020 24.5* 26.0 - 34.0 pg Final   • MCHC 11/10/2020 32.1  32.0 - 36.5 g/dL Final   • RDW-CV 11/10/2020 14.1  11.0 - 15.0 % Final   • PLT 11/10/2020 256  140 - 450 K/mcL Final   • Neutrophil, Percent 11/10/2020 53  % Final   • Lymphocytes, Percent 11/10/2020 34  % Final   • Mono, Percent 11/10/2020 11  % Final   • Eosinophils, Percent 11/10/2020 2  % Final   • Basophils, Percent 11/10/2020 0  % Final   • Absolute Neutrophils 11/10/2020 2.8  1.8 - 7.7 K/mcL Final   • Absolute Lymphocytes 11/10/2020 1.8  1.0 - 4.8 K/mcL Final   • Absolute  Monocytes 11/10/2020 0.6  0.3 - 0.9 K/mcL Final   • Absolute Eosinophils  11/10/2020 0.1  0.1 - 0.5 K/mcL Final   • Absolute Basophils 11/10/2020 0.0  0.0 - 0.3 K/mcL Final   • RDW-SD 11/10/2020 37.9* 39.0 - 50.0 fL Final       ASSESSMENT AND PLAN:  This is a 34 year old female who presents with :  1. Annual physical exam    2. Anxiety    3. Need for vaccination    4. History of Bell's palsy        Plan:  Annual physical exam:  Labs:   Recent labs reviewed and discussed in detail.  Ordered labs. Refer to orders.  Recommended iron rich foods, take iron supplement. Informed that iron supplement causes constipation. Take a stool softener if constipated. Informed some vegetarians are iron deficient.     Current medications reviewed and reconciled. Continue current medications.  Past medical, surgical, social and family history, allergies reviewed and updated.  Encouraged to take precautions in view of the current COVID-19 crisis. Advised to follow social distancing, wear facial mask, wash hands frequently to prevent spread of COVID-19 virus.   Advised to perform regular aerobic exercises for 20-30 minutes for most of the days of the week. Discussed balanced diet; recommended Mediterranean diet. Continue monitoring portion size.  Decrease intake of simple carbs, refined sugars, and processed foods. Eat more whole grains, fruits and vegetables.  Advised to restrict intake of saturated fats and encouraged intake of proteins from lean meat. Encouraged using cooking oil which are rich in Omega 3 fatty acids.  Advised to use a seat belt when traveling by car and use sunscreen when going outdoors.     Anxiety:  Well controlled on medication.  Continue Fluoxetine 40 mg as prescribed. Refills provided.     Need for vaccination:  Administered influenza vaccine by nurse today.     History of Bell's palsy:  Stable.  Continue current management.    Call if needed.    Body mass index is 23.83 kg/m².    BMI ASSESSMENT/PLAN:  Patient  BMI is within normal range.    Over the last 2 weeks, how often have you been bothered by the following problems?          PHQ2 Score: 0  PHQ2 Score Interpretation: No further screening needed  1. Little interest or pleasure in activity?: 0  2. Feeling down, depressed, or hopeless?: 0     PHQ9 Score: 1  PHQ9 Score Interpretation: Minimal Depression  3. Trouble falling, staying asleep or sleeping all the time?: 0  4. Feeling tired or having little energy?: 1  5. Poor appetite or overeating?: 0  6. Feeling bad about yourself - or that you are a failure or that you have let yourself or your family down?: 0  7. Trouble concentrating on things such as reading a newspaper or watching television?: 0  8. Moving or speaking so slowly that other people could have noticed? Or the opposite - being so fidgety or restless that you were moving around a lot more than usual?: 0  9. Thoughts that you would be better off dead, or of hurting yourself in some way?: 0         DEPRESSION ASSESSMENT/PLAN:  Mild symptoms, will monitor and reevaluate.     Orders Placed This Encounter   • Influenza Quadrivalent Split Pres Free 0.5 mL Pre-filled Vacc (FluLaval, Fluzone, Fluarix; ages 6+ months - [id number]   • Lipid Panel With Reflex   • Urinalysis With Microscopy & Culture If Indicated   • Comprehensive Metabolic Panel   • CBC with Automated Differential   • fluoxetine (PROzac) 40 MG capsule       Return in about 1 year (around 11/12/2021) for physical with labs prior.    Medical compliance with plan discussed and risks of non-compliance reviewed.  Patient education completed on disease process, etiology & prognosis.  Proper usage and side effects of medications reviewed & discussed.  Patient understands and agrees with the plan.  Return to clinic as clinically indicated as discussed with patient who verbalized understanding of the plan and is in agreement with the plan.    ILucia, have created a visit summary document based on the  audio recording between Anders Booker MD and this patient for the physician to review, edit as needed, and authenticate.  Creation Date: 11/13/2020      I have reviewed and edited the visit summary above and attest that it is accurate.         English

## 2023-02-12 NOTE — ED PROVIDER NOTE - NSICDXPASTSURGICALHX_GEN_ALL_CORE_FT
PAST SURGICAL HISTORY:  H/O  section     S/P ANGELITO (total abdominal hysterectomy) fibroids     S/P tooth extraction ' 2009    Trent teeth X2    Traumatic loss of toenail of great toe excision  right side 2018

## 2023-02-12 NOTE — ED ADULT NURSE NOTE - OBJECTIVE STATEMENT
46 yo F PMHx of HTN, partial hysterectomy, tested covid-19 pos on 2/10/23, family hx of cardiac arrest in father at age 65 p/w sudden onset chest pain 8/10 non radiating while at rest 2 hrs ago. pt tried 162 mg of aspirin at home with minimal relief.   pt is A&Ox4, MAEW, skin warm dry intact/normal for race, chest wall non tender to palpation, pt endorses pain is central over epigastrium non radiating, no JVD, cap refill < 2 seconds.  Pt denies: syncope, diaphoresis, headache, dizziness, palpitations, cough, SOB, abdominal pain, n/v/d, urinary symptoms, fevers, chills, weakness at this time.

## 2023-02-12 NOTE — ED ADULT NURSE REASSESSMENT NOTE - NS ED NURSE REASSESS COMMENT FT1
Received pt at shift change, presented to ED c/o chest pain. Currently A & O x 4, in no acute distress, ambulatory. Pt is covid positive, isolation status discussed. Evaluation in progress. On continuous cardiac monitor. Pending reassessment and disposition. Will continue to monitor.

## 2023-02-12 NOTE — ED PROVIDER NOTE - PATIENT PORTAL LINK FT
You can access the FollowMyHealth Patient Portal offered by [facility] by registering at the following website: [email or web address]. By joining [facility]’s FollowMyHealth portal, you will also be able to view your health information using other applications (apps) compatible with our system.

## 2023-02-12 NOTE — REVIEW OF SYSTEMS
[FreeTextEntry2] : Constitutional:  no fever and no chills. \par Eyes:  no discharge. \par HEENT:  no earache. \par Cardiovascular:  no chest pain, no palpitations and no lower extremity edema. \par Respiratory:  no shortness of breath, no wheezing and no cough. \par Gastrointestinal:  no abdominal pain, no nausea and no vomiting. \par Genitourinary:  no dysuria. \par Musculoskeletal:  no joint pain. \par Integumentary:  no itching. \par Neurological:  no headache. \par Psychiatric:  not suicidal. \par Hematologic/Lymphatic:  no easy bleeding. [FreeTextEntry4] : see hpi [FreeTextEntry9] : see hpi

## 2023-02-12 NOTE — ED PROVIDER NOTE - NSFOLLOWUPINSTRUCTIONS_ED_ALL_ED_FT
Follow up with your PMD and/or cardiologist within 48-72 hours. Show copies of your reports given to you. Take all of your other medications as previously prescribed.     ****For Cardiology: Call [phone] to schedule an appointment ASAP. Most patients can be seen within 48 hours. Mention that you were just discharged from the emergency room and need to be seen immediately.    You should return to the hospital if you begin to have worsening or persistent chest pain especially that radiates to the arm/jaw/back, shortness of breath or chest pain with exertion that is different than normal for you, new or worsening in any lower extremity edema (swelling), sudden onset of cold sweats with difficulty breathing, or for any other concerns.    Form more information on Heart Health please visit the American Heart Association's Website at: [email or web address]    Please follow up with your primary care doctor. If you do not have a primary care doctor, you may refer to the clinic below:     [facility] - Primary Care   [address] [place] [address]  Phone: [phone]

## 2023-02-12 NOTE — ED ADULT NURSE NOTE - NSIMPLEMENTINTERV_GEN_ALL_ED
Implemented All Universal Safety Interventions:  [place] to call system. Call bell, personal items and telephone within reach. Instruct patient to call for assistance. Room bathroom lighting operational. Non-slip footwear when patient is off stretcher. Physically safe environment: no spills, clutter or unnecessary equipment. Stretcher in lowest position, wheels locked, appropriate side rails in place.

## 2023-02-12 NOTE — ED PROVIDER NOTE - OBJECTIVE STATEMENT
Jacque att-year-old female with no significant past medical history presenting to the ED with complaints of left-sided sharp chest pain starting this evening.  Patient reports that she was sitting down watching television when she developed a sharp pain underneath the left breast nonradiating.  Worse with deep inspiration.  No shortness of breath fevers or chills.  States that she tested positive for COVID yesterday.  Symptoms of sinus congestion headache and sore throat.  No cough.  Never had similar pain in the past.  No recent travel no calf pain or swelling, no recent surgery is on no hormones.  No history of blood clots.  Had a stress test last year which patient states was normal.

## 2023-02-12 NOTE — ED PROVIDER NOTE - ATTENDING CONTRIBUTION TO CARE
I, Kiki Santillan, performed a history and physical exam of the patient and discussed their management with the resident and /or advanced care provider. I reviewed the resident and /or ACP's note and agree with the documented findings and plan of care. I was present and available for all procedures.

## 2023-02-12 NOTE — ED PROVIDER NOTE - PROGRESS NOTE DETAILS
Nathalie Delong MD (PGY3) -  Pt seen & reassessed.  Pt symptomatically improved.  NAD, pt tolerated PO.  We discussed the results of ED w/u w/patient (incl. presumptive Emergency Department dx, associated anticipatory guidance, stressing importance of prompt f/u, return precautions), & gave them a copy of results.  Patient verbalized understanding of ED course & agreed with our f/u recommendations, has decisional making capacity.  Pt st they will f/u w/PMD/cardiologist within the next 3 days; pt agrees to call today or tomorrow for an appointment. Pt agrees to return to the ED if there is any worsening or concerning symptoms.

## 2023-02-12 NOTE — ED PROVIDER NOTE - NSICDXPASTMEDICALHX_GEN_ALL_CORE_FT
PAST MEDICAL HISTORY:  Environmental allergies     Fibroid uterus h/o ANGELITO 2013    HTN (hypertension) last treated 2013    Migraine     Soft tissue tumor, benign right side of scalp

## 2023-02-12 NOTE — HISTORY OF PRESENT ILLNESS
[Home] : at home, [unfilled] , at the time of the visit. [Medical Office: ([facility])___] : at the medical office located in  [Verbal consent obtained from patient] : the patient, [unfilled] [FreeTextEntry8] : SHANICE SHAVER  is a 47 year old female  with history of HTN, constipation, preDM, vit D deficiency, chronic fatigue, dizziness presented today for headache and neck pain for 3 days. \par \par Current sx: sore throat, head ache, fever, chill, ear pain, tooth pain\par Trying Theraflu, Tylenol.\par She called out to job and needs MD letter. \par She did not check home COVID test yet. \par Denied CP, SOB, abdominal pain, n/v/c/d.

## 2023-02-12 NOTE — ASSESSMENT
[FreeTextEntry1] : SHANICE SHAVER  is a 47 year old female  with history of HTN, constipation, preDM, vit D deficiency, chronic fatigue, dizziness presented today for headache and neck pain.\par \par \par # cold like sx for 3 days\par check COVID/flu/RSV at near [facility] lab. Rx made. \par Continue supportive care, rest, plenty of oral fluid intake. \par Patient was advised to call us for any worsening sx or if no resolution.\par Emailed out MD letter for job.\par \par f/up result.\par

## 2023-02-12 NOTE — ED PROVIDER NOTE - CLINICAL SUMMARY MEDICAL DECISION MAKING FREE TEXT BOX
Jacque att-year-old female with no significant past medical history presenting to the ED with complaints of left-sided sharp chest pain starting this evening.  Patient reports that she was sitting down watching television when she developed a sharp pain underneath the left breast nonradiating.  Worse with deep inspiration.  No shortness of breath fevers or chills.  States that she tested positive for COVID yesterday.  Symptoms of sinus congestion headache and sore throat.  No cough.  Never had similar pain in the past.  No recent travel no calf pain or swelling, no recent surgery is on no hormones.  No history of blood clots.  Had a stress test last year which patient states was normal.    Well-appearing in no acute distress resting comfortably on stretcher.  Regular rate and rhythm, lungs clear to auscultation bilaterally speaking in full sentences without increased work of breathing.  Saturating well on room air.  Abdomen soft nontender no rebound or guarding.  No CVA tenderness.  No lower extremity edema.  No tenderness below the left breast.  No calf tenderness.  Low suspicion for ACS given normal stress test last year.  Prolonged QT on EKG no overt ischemic changes.  Possible pleurisy secondary to COVID infection.  However given COVID-positive increasing hypercoagulable state and pleuritic chest pain will obtain D-dimer to evaluate for possible PE.  Patient not tachycardic saturating 100% able to speak in full sentences nonlabored.  Will obtain labs including D-dimer, chest x-ray reassess.

## 2023-02-12 NOTE — ED ADULT TRIAGE NOTE - CHIEF COMPLAINT QUOTE
C/o L sided non radiating chest pain since 0445. Endorses lightheadedness. Denies SOB, fever, NV. PMH HTN

## 2023-02-12 NOTE — ED ADULT NURSE NOTE - SUICIDE SCREENING QUESTION 3
Refill Request for Duloxetne HCl 60mg      Last refill 12/04/2021     Last office visit 12/15/2021 without follow up instructions.      Last labs 9/14/2021     Routed to PCP       
No

## 2023-02-13 ENCOUNTER — NON-APPOINTMENT (OUTPATIENT)
Age: 48
End: 2023-02-13

## 2023-03-07 ENCOUNTER — APPOINTMENT (OUTPATIENT)
Dept: INTERNAL MEDICINE | Facility: CLINIC | Age: 48
End: 2023-03-07
Payer: COMMERCIAL

## 2023-03-07 ENCOUNTER — EMERGENCY (EMERGENCY)
Facility: HOSPITAL | Age: 48
LOS: 1 days | Discharge: HOME | End: 2023-03-07
Attending: EMERGENCY MEDICINE
Payer: COMMERCIAL

## 2023-03-07 VITALS
RESPIRATION RATE: 18 BRPM | TEMPERATURE: 98 F | HEART RATE: 88 BPM | OXYGEN SATURATION: 100 % | SYSTOLIC BLOOD PRESSURE: 154 MMHG | DIASTOLIC BLOOD PRESSURE: 88 MMHG

## 2023-03-07 VITALS
WEIGHT: 148 LBS | BODY MASS INDEX: 29.84 KG/M2 | HEIGHT: 59 IN | OXYGEN SATURATION: 98 % | DIASTOLIC BLOOD PRESSURE: 86 MMHG | HEART RATE: 79 BPM | SYSTOLIC BLOOD PRESSURE: 138 MMHG

## 2023-03-07 VITALS
HEART RATE: 73 BPM | RESPIRATION RATE: 16 BRPM | OXYGEN SATURATION: 100 % | TEMPERATURE: 98 F | HEIGHT: 59 IN | DIASTOLIC BLOOD PRESSURE: 82 MMHG | WEIGHT: 145.06 LBS | SYSTOLIC BLOOD PRESSURE: 132 MMHG

## 2023-03-07 DIAGNOSIS — S69.91XD UNSPECIFIED INJURY OF RIGHT WRIST, HAND AND FINGER(S), SUBSEQUENT ENCOUNTER: ICD-10-CM

## 2023-03-07 DIAGNOSIS — Z98.891 HISTORY OF UTERINE SCAR FROM PREVIOUS SURGERY: Chronic | ICD-10-CM

## 2023-03-07 DIAGNOSIS — Z90.710 ACQUIRED ABSENCE OF BOTH CERVIX AND UTERUS: Chronic | ICD-10-CM

## 2023-03-07 DIAGNOSIS — S91.203A UNSPECIFIED OPEN WOUND OF UNSPECIFIED GREAT TOE WITH DAMAGE TO NAIL, INITIAL ENCOUNTER: Chronic | ICD-10-CM

## 2023-03-07 PROCEDURE — 99284 EMERGENCY DEPT VISIT MOD MDM: CPT | Mod: 25

## 2023-03-07 PROCEDURE — 73110 X-RAY EXAM OF WRIST: CPT

## 2023-03-07 PROCEDURE — 73090 X-RAY EXAM OF FOREARM: CPT

## 2023-03-07 PROCEDURE — 76377 3D RENDER W/INTRP POSTPROCES: CPT | Mod: 26

## 2023-03-07 PROCEDURE — 99053 MED SERV 10PM-8AM 24 HR FAC: CPT

## 2023-03-07 PROCEDURE — 99213 OFFICE O/P EST LOW 20 MIN: CPT

## 2023-03-07 PROCEDURE — 73110 X-RAY EXAM OF WRIST: CPT | Mod: 26,RT

## 2023-03-07 PROCEDURE — 73200 CT UPPER EXTREMITY W/O DYE: CPT | Mod: 26,RT,MA

## 2023-03-07 PROCEDURE — 29125 APPL SHORT ARM SPLINT STATIC: CPT | Mod: RT

## 2023-03-07 PROCEDURE — 99285 EMERGENCY DEPT VISIT HI MDM: CPT | Mod: 25

## 2023-03-07 PROCEDURE — 76377 3D RENDER W/INTRP POSTPROCES: CPT

## 2023-03-07 PROCEDURE — 73090 X-RAY EXAM OF FOREARM: CPT | Mod: 26,LT

## 2023-03-07 PROCEDURE — 73200 CT UPPER EXTREMITY W/O DYE: CPT | Mod: MA

## 2023-03-07 PROCEDURE — 29125 APPL SHORT ARM SPLINT STATIC: CPT

## 2023-03-07 NOTE — PHYSICAL EXAM
[No Acute Distress] : no acute distress [Well Nourished] : well nourished [Well Developed] : well developed [Well-Appearing] : well-appearing [de-identified] : Right hand and forearm in a rigis splint.  No swelling of fingers noted with good ROM of fingers.

## 2023-03-07 NOTE — HISTORY OF PRESENT ILLNESS
[FreeTextEntry8] : 47-year-old female here with complaints of a right hand injury.  Hit her hand with a suit case while at work on 2/27/23 and has been having pain since.  She went to the emergency room where she had a CT scan done that I was able to review which shows some scaphoid widening and possible ligament tearing.  She was placed in a splint and advised to keep it on at all times until she is seen and evaluated by hand surgeon.  She is in the process of scheduling that appointment.  She tells me that she has been able to work she just cannot lift anything because her hand is in the splint.  She needs a note regarding her restrictions.

## 2023-03-07 NOTE — ED PROVIDER NOTE - CLINICAL SUMMARY MEDICAL DECISION MAKING FREE TEXT BOX
Griffin PGY2: 46yo F with no PMH presents to ED for eval of R wrist injury at work 8d ago with pain distal forearm and ulnar styloid, neurovasc intact. Likely occult fx vs sprain vs msk. Xrays. Offered pain meds which pt declined at this time. Likely dispo with ortho f/u.

## 2023-03-07 NOTE — ED ADULT NURSE NOTE - NS ED NURSE DC INFO COMPLEXITY
Nutrition Education/Meals and Snack
Simple: Patient demonstrates quick and easy understanding/Verbalized Understanding

## 2023-03-07 NOTE — ASSESSMENT
[FreeTextEntry1] : 47-year-old female seen in our office as a follow-up to a wrist injury that occurred while at work on 2/27/2023.  She was advised no lifting or pulling of heavy objects.  She will remain in the splint until she is seen and evaluated by hand surgery.  Referral given.

## 2023-03-07 NOTE — ED ADULT NURSE NOTE - OBJECTIVE STATEMENT
47y BIB self p/w R wrist pain x 1 week. Pt is axo4. Pt states that last week a luggage had impacted her wrist while at work. Pt states that she has been wearing a wrist splint and applying cold compress with moderate relief but pain still persist. This would be the first initial visit for the symptoms, no hx of fx on the R hand/wrist.

## 2023-03-07 NOTE — ED PROVIDER NOTE - PHYSICAL EXAMINATION
CONSTITUTIONAL: well appearing  SKIN: no broken skin, no obvious bruising or lesions appreciated  NECK: Supple; non tender.  CARD: RRR  RESP: CTAB  ABD: S/NT no R/G  EXT: R wrist tenderness lower distal 3rd forearm and over ulnar styloid, no snufflebox tenderness, able to range fingers well including 'ok' sign with thumb and all fingers,  strength intact though hurts to squeeze.  NEURO: Grossly non-focal, intact sensation  PSYCH: Cooperative, appropriate.

## 2023-03-07 NOTE — ED PROVIDER NOTE - CARE PROVIDER_API CALL
Leidy Lei (MD)  Plastic Surgery  [address] [place] [address], [address]  [place], [place] [id number]  Phone: [phone]  Fax: [phone]  Follow Up Time:

## 2023-03-07 NOTE — ED PROVIDER NOTE - WORK/EXCUSE FORM DATE
Initiate Treatment: Lather scalp, face and ears with Head and Shoulders Clinical Strength (Selenium) Shampoo qd. If no improvement, pt will contact office to discuss possibly starting topical steroid use. Detail Level: Zone 13-Mar-2023

## 2023-03-07 NOTE — ED PROVIDER NOTE - NSICDXPASTSURGICALHX_GEN_ALL_CORE_FT
PAST SURGICAL HISTORY:  H/O  section     S/P ANGELITO (total abdominal hysterectomy) fibroids     S/P tooth extraction ' 2009    [place] teeth X2    Traumatic loss of toenail of great toe excision  right side 2018

## 2023-03-07 NOTE — ED PROVIDER NOTE - PATIENT PORTAL LINK FT
You can access the FollowMyHealth Patient Portal offered by [facility] by registering at the following website: [email or web address]. By joining [facility]’s FollowMyHealth portal, you will also be able to view your health information using other applications (apps) compatible with our system.

## 2023-03-07 NOTE — ED PROVIDER NOTE - PROGRESS NOTE DETAILS
Attending MD Bernabe: XR read noted, hand surgeon paged Griffin PGY2: splint placed, recs discussed. Return precautions given. Attending MD Bernabe: Dr. Moya discussed with Dr. Lei, requested volar splint, placed.  Will give hand follow up.  Stable for discharge.

## 2023-03-07 NOTE — ED PROVIDER NOTE - OBJECTIVE STATEMENT
46yo F with no PMH presents to ED for eval of R wrist injury. ~8d ago at work pt had a bag rollover onto her wrist. She immediately had pain, but didn't have time to see a provider/xray/ed. Never deformed but has lateral tenderness and burning sensation in area. Hasn't been able to use wrist freely since, more comfortable in the OTC brace she purchased. She's been icing it, tylenol for pain but since nothing has resolved she came for xray. Has a workman's comp claim with her job. No CP, SOB, abd pain, NVDC. no elbow or shoulder tenderness.

## 2023-03-07 NOTE — ED PROVIDER NOTE - ATTENDING CONTRIBUTION TO CARE
Attending MD Bernabe: I personally have seen and examined this patient.  Resident note reviewed and agree on plan of care and except where noted.  See below for details.     seen in [facility]    47F with no reported contributory PMH/PSH/Meds, no known drug allergies presents to the ED with R wrist pain since 2/27/23.  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  Reports had immediate, sudden onset pain.  Reports initially had localized edema which has improved.  Reports presents today for persistent pain.  Reports has no tenderness on one side of the wrist (indicates radial) and pain is localized over the other side of the wrist (indicates ulnar styloid).  Reports pain with palpation and also with pronation/supination.  Reports R hand dominant.  Denies loss of sensory or motor function limitation.  Denies other injury.  Denies previous injury.    Exam:   General: NAD  HENT: head NCAT, airway patent   Chest: symmetric chest rise, no increased work of breathing  MSK: ranging neck freely, +2 radials, FROM at R hand fingers, FROM at R wrist, +tenderness to palpation at ulnar styloid extending proximally with no overlying ecchymosis or edema, non tender over the anatomical snuff box, no tenderness with axial loading of R thumb  Neuro: moving all extremities spontaneously, sensory grossly intact, no gross neuro deficits  Psych: normal mood and affect     A/P: 47F with R wrist pain s/p trauma, Attending MD Bernabe: I personally have seen and examined this patient.  Resident note reviewed and agree on plan of care and except where noted.  See below for details.     seen in [facility]    47F with no reported contributory PMH/PSH/Meds, no known drug allergies presents to the ED with R wrist pain since 2/27/23.  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  Reports had immediate, sudden onset pain.  Reports initially had localized edema which has improved.  Reports presents today for persistent pain.  Reports has no tenderness on one side of the wrist (indicates radial) and pain is localized over the other side of the wrist (indicates ulnar styloid).  Reports pain with palpation and also with pronation/supination.  Reports R hand dominant.  Denies loss of sensory or motor function limitation.  Denies other injury.  Denies previous injury.    Exam:   General: NAD  HENT: head NCAT, airway patent   Chest: symmetric chest rise, no increased work of breathing  MSK: ranging neck freely, +2 radials, FROM at R hand fingers, FROM at R wrist, +tenderness to palpation at ulnar styloid extending proximally with no overlying ecchymosis or edema, non tender over the anatomical snuff box, no tenderness with axial loading of R thumb  Neuro: moving all extremities spontaneously, sensory grossly intact, no gross neuro deficits  Psych: normal mood and affect     A/P: 47F with R wrist pain s/p trauma, will evaluate for but not limited to bony injury, sprain Attending MD Bernabe: I personally have seen and examined this patient.  Resident note reviewed and agree on plan of care and except where noted.  See below for details.     seen in [facility]    47F with no reported contributory PMH/PSH/Meds, no known drug allergies presents to the ED with R wrist pain since 2/27/23.  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  Reports had immediate, sudden onset pain.  Reports initially had localized edema which has improved.  Reports presents today for persistent pain.  Reports has no tenderness on one side of the wrist (indicates radial) and pain is localized over the other side of the wrist (indicates ulnar styloid).  Reports pain with palpation and also with pronation/supination.  Reports R hand dominant.  Denies loss of sensory or motor function limitation.  Denies other injury.  Denies previous injury.    Exam:   General: NAD  HENT: head NCAT, airway patent   Chest: symmetric chest rise, no increased work of breathing  MSK: ranging neck freely, +2 radials, cap refill<2s, FROM at R hand fingers, FROM at R wrist, +tenderness to palpation at ulnar styloid extending proximally with no overlying ecchymosis or edema, non tender over the anatomical snuff box, no tenderness with axial loading of R thumb, FROM and nontender at elbow  Neuro: moving all extremities spontaneously, sensory grossly intact, no gross neuro deficits  Psych: normal mood and affect     A/P: 47F with R wrist pain s/p trauma, will evaluate for but not limited to bony injury, sprain Attending MD Bernabe: I personally have seen and examined this patient.  Resident note reviewed and agree on plan of care and except where noted.  See below for details.     seen in [facility]    47F with no reported contributory PMH/PSH/Meds, no known drug allergies presents to the ED with R wrist pain since 2/27/23.  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  Reports had immediate, sudden onset pain.  Reports initially had localized edema which has improved.  Reports presents today for persistent pain.  Reports has no tenderness on one side of the wrist (indicates radial) and pain is localized over the other side of the wrist (indicates ulnar styloid).  Reports pain with palpation and also with pronation/supination.  Reports R hand dominant.  Denies loss of sensory or motor function limitation.  Denies other injury.  Denies previous injury.    Exam:   General: NAD  HENT: head NCAT, airway patent   Chest: symmetric chest rise, no increased work of breathing  MSK: ranging neck freely, +2 radials, cap refill<2s, FROM at R hand fingers, FROM at R wrist, +tenderness to palpation at ulnar styloid extending proximally with no overlying ecchymosis or edema, non tender over the anatomical snuff box, no tenderness with axial loading of R thumb, FROM and nontender at elbow  Neuro: moving all extremities spontaneously, sensory grossly intact, no gross neuro deficits  Psych: normal mood and affect     A/P: 47F with R wrist pain s/p trauma, will evaluate for but not limited to bony injury, sprain, will obtain XR to eval, declines analgesia

## 2023-03-07 NOTE — ED PROVIDER NOTE - ADDITIONAL NOTES AND INSTRUCTIONS:
To Whom it May Concern - Patient seen and evaluated in Emergency Room. Please excuse the above individual for medical care and recovery until date above. Patient should not perform any heavy lifting with her hand/arm until medically cleared by provider. Thank you for you care. - Nieves Moya MD.

## 2023-03-07 NOTE — ED PROVIDER NOTE - NSFOLLOWUPINSTRUCTIONS_ED_ALL_ED_FT
Wrist Injury, Right Wrist Injury, Right    - Keep splint on at all times  - Avoid getting splint wet - cover with plastic bag while in shower/bath.  - If the dressing feels too tight, can loosen overlying ACE bandage  - Recommend NON-weight bearing - no heavy lifting or manipulation of wrist.     - Please follow up with Hand Surgeon Dr Patel in next 2-4 days - please call the attached office to make an appointment    - If you have any worsening pain, numbness/tingling, loss of ability to move fingers, immediately return to seek medical evaluation.

## 2023-03-07 NOTE — ED ADULT NURSE NOTE - NSICDXPASTSURGICALHX_GEN_ALL_CORE_FT
PAST SURGICAL HISTORY:  H/O  section     S/P ANGELITO (total abdominal hysterectomy) fibroids     S/P tooth extraction ' 2009    [place] teeth X2    Traumatic loss of toenail of great toe excision  right side 2018

## 2023-03-14 ENCOUNTER — NON-APPOINTMENT (OUTPATIENT)
Age: 48
End: 2023-03-14

## 2023-03-14 ENCOUNTER — APPOINTMENT (OUTPATIENT)
Dept: ORTHOPEDIC SURGERY | Facility: CLINIC | Age: 48
End: 2023-03-14
Payer: OTHER MISCELLANEOUS

## 2023-03-14 VITALS
DIASTOLIC BLOOD PRESSURE: 86 MMHG | WEIGHT: 150 LBS | HEIGHT: 59 IN | OXYGEN SATURATION: 100 % | SYSTOLIC BLOOD PRESSURE: 137 MMHG | RESPIRATION RATE: 17 BRPM | HEART RATE: 63 BPM | BODY MASS INDEX: 30.24 KG/M2

## 2023-03-14 VITALS — WEIGHT: 150 LBS | HEIGHT: 59 IN | BODY MASS INDEX: 30.24 KG/M2

## 2023-03-14 DIAGNOSIS — S63.501A UNSPECIFIED SPRAIN OF RIGHT WRIST, INITIAL ENCOUNTER: ICD-10-CM

## 2023-03-14 PROCEDURE — 99203 OFFICE O/P NEW LOW 30 MIN: CPT

## 2023-03-14 PROCEDURE — 99072 ADDL SUPL MATRL&STAF TM PHE: CPT

## 2023-03-14 NOTE — ASSESSMENT
[Indicate if, in your opinion, the incident that the patient described was the competent medical cause of this injury/illness.] : The incident that the patient described was the competent medical cause of this injury/illness: Yes [Indicate if the patient's complaints are consistent with his/her history of the injury/illness.] : Indicate if the patient's complaints are consistent with his/her history of the injury/illness: Yes [Can the patient return to usual work activities as indicated? If yes, indicate date___] : The patient can return to usual work activities on [unfilled] [Are there any work limitations? (If so, explain and quantify, including the anticipated duration of the limitations)] : There are work limitations. [FreeTextEntry7] : Refrain from heavy lifting with right hand for 2 weeks.

## 2023-03-14 NOTE — PHYSICAL EXAM
[de-identified] : General: No acute distress\par Mental: Alert and oriented x3\par Eyes: Conjunctivitis not seen\par Chest: Symmetric chest rise, no audible wheezing\par Skin: Bilateral lower extremities absent from rashes and ulcers\par Abdomen: No distention\par \par Right wrist: Splint is removed.  Intact skin, no ecchymosis, no edema, no swelling.\par Mild tenderness at the radial wrist, nontender elsewhere.\par Fires wrist flexion and extension, ulnar and radial deviation without pain.\par Negative Barreto test.\par 5/5 strength throughout, motor and sensory intact, palpable radial pulse. [de-identified] : Images reviewed from the emergency department on 3/7/2023.\par Right wrist x-rays show neutral alignment, maintained joint spaces, no fractures or dislocations.  Slight widening at the scapholunate interval approximately 3 mm although this is not a perfect AP image.\par Right forearm x-ray shows neutral alignment, no fractures or dislocations.\par \par Right wrist CT shows slight widening of the scapholunate interval measuring 3.5 mm, no other fractures or dislocations.

## 2023-03-14 NOTE — HISTORY OF PRESENT ILLNESS
[Has the patient missed work because of the injury/illness?] : The patient has missed work because of the injury/illness. [FreeTextEntry1] : 47-year-old female, right-hand-dominant, presenting with right wrist pain since 2/27/2023 after her wrist got hit by a piece of luggage.  [place] and [place] when this happened.  She did not have significant pain initially and was able to manage the symptoms.  She then developed mild swelling and pain about a week later, went to the emergency department where x-rays and CT scan of the wrist was obtained.  She was placed into a splint.  Currently she has mild dorsal and radial wrist pain, no swelling, no numbness or tingling, no clicking or clunking, symptoms worse with increased activity and improves with rest.  She is not taking any pain medications.  No history of wrist issues in the past.

## 2023-03-14 NOTE — DISCUSSION/SUMMARY
[de-identified] : 47-year-old female with right wrist injury consistent with mild wrist sprain.  Imaging shows slight widening of the scapholunate interval, although she is not having symptoms of instability or chronic injury.  Recommended gentle stretching exercises to the wrist, ice or heat, Tylenol or anti-inflammatories as needed.  I recommended she refrain from lifting heavy items above 30 pounds with the right hand for approximately 2 weeks.  Otherwise she may gradually return to activities as usual.  Follow-up as needed.

## 2023-03-22 ENCOUNTER — APPOINTMENT (OUTPATIENT)
Dept: INTERNAL MEDICINE | Facility: CLINIC | Age: 48
End: 2023-03-22
Payer: COMMERCIAL

## 2023-03-22 VITALS
BODY MASS INDEX: 29.64 KG/M2 | OXYGEN SATURATION: 98 % | HEIGHT: 59 IN | WEIGHT: 147 LBS | SYSTOLIC BLOOD PRESSURE: 126 MMHG | HEART RATE: 60 BPM | DIASTOLIC BLOOD PRESSURE: 84 MMHG

## 2023-03-22 DIAGNOSIS — Z02.89 ENCOUNTER FOR OTHER ADMINISTRATIVE EXAMINATIONS: ICD-10-CM

## 2023-03-22 DIAGNOSIS — S63.501D UNSPECIFIED SPRAIN OF RIGHT WRIST, SUBSEQUENT ENCOUNTER: ICD-10-CM

## 2023-03-22 PROCEDURE — 99214 OFFICE O/P EST MOD 30 MIN: CPT

## 2023-03-22 NOTE — ASSESSMENT
[FreeTextEntry1] : SHANICE SHAVER  is a 47 year old female  with history of HTN, constipation, preDM, vit D deficiency, chronic fatigue, dizziness presented today for MD letter for [employer]. \par \par # MD letter for [employer]\par Provided\par \par # Rt wrist sprain\par xray noted with slight widening of the scapholunate interval.\par Continue wear the rigid splint. \par Start PT, take RICE method and Tylenol/ NSAID as hand surgeon's guide.\par f/up with hand surgeon as planned. \par \par Patient was advised to call us for any worsening sx or if no resolution. \par

## 2023-03-22 NOTE — PHYSICAL EXAM
[de-identified] : WDWN in NAD\par HEENT:  unremarkable\par Neck:  supple, no JVD, no LN\par Lungs:  CTA B/L, no W/R/R\par Heart:  Reg rate, +S1S2, no M/R/G\par Abdomen:  soft, NT, ND, +BS, no masses, no HS-megaly\par Genital: No pubic or genital lesions noted.\par Ext:  no C/C/E\par Neuro:  no focal deficits [de-identified] : Right hand and forearm in a rigid splint. Normal capillary refill of rt hand fingers. No skin opening or discoloration. No evident finger swelling.  Normal fine motor. No finger weakness, tingling/numbing sense.

## 2023-03-22 NOTE — HISTORY OF PRESENT ILLNESS
[FreeTextEntry1] : f/up [de-identified] : SHANICE SHAVER  is a 47 year old female  with history of HTN, constipation, preDM, vit D deficiency, chronic fatigue, dizziness presented today for MD letter for [employer]. \par \par This visit is not for worker's compensation case or form. \par She incurred Rt wrist work related injury at [employer] on 2/27/23 where she works as second job. \par \par Reviewed note by Dr. June, hand surgeon for Worker's compensation visit on 3/14/23.\par :Imaging shows slight widening of the scapholunate interval, although she is not having symptoms of instability or chronic injury. Recommended gentle stretching exercises to the wrist, ice or heat, Tylenol or anti-inflammatories as needed. Recommended to refrain from lifting heavy items above 30 pounds with the right hand for approximately 2 weeks. Otherwise she may gradually return to activities as usual as of 3/15/23.\par \par She needs letter for [employer], her full time job to verify that she was seen by worker's come hand surgeon and Dr. Castro PCP and not ready to return to work yet. She wishes to RTW on 4/3/23 with light duty. \par She did not return to neither job due to fatigue and feeling of not ready. \par [employer] job requires constant typing and she does not feel comfortable.\par Her next appointment with hand surgeon will be last week of April and she will get MRI too. \par \par She is wearing Rt hand splint. Reports dull aching pain on left palm and forearm.\par Her pain level is mild when bennett the Rt hand splint but without the splint and bending, rotating wrist caused pain 5-6 /10.\par She will start physical therapy soon and manage pain with Tylenol or NSAID prn.\par Denied fever, chills,CP, SOB, abdominal pain, n/v/c/d.

## 2023-06-02 ENCOUNTER — APPOINTMENT (OUTPATIENT)
Dept: INTERNAL MEDICINE | Facility: CLINIC | Age: 48
End: 2023-06-02
Payer: COMMERCIAL

## 2023-06-02 VITALS
SYSTOLIC BLOOD PRESSURE: 128 MMHG | WEIGHT: 150 LBS | DIASTOLIC BLOOD PRESSURE: 88 MMHG | HEART RATE: 85 BPM | OXYGEN SATURATION: 99 % | HEIGHT: 59 IN | BODY MASS INDEX: 30.24 KG/M2

## 2023-06-02 DIAGNOSIS — T78.40XA ALLERGY, UNSPECIFIED, INITIAL ENCOUNTER: ICD-10-CM

## 2023-06-02 PROCEDURE — 99213 OFFICE O/P EST LOW 20 MIN: CPT

## 2023-06-05 NOTE — ASSESSMENT
[FreeTextEntry1] : SHANICE SHAVER is a 47 year old female with history of HTN, constipation, preDM, vit D deficiency, chronic fatigue, dizziness presented today for allergy.\par \par # HM\par Made referral for colonoscopy.\par Made order for breast MRI.\par \par # severe allergy reaction at job\par Made referral for AI.\par Pt wishes to get AI letter for work accommodation. \par \par # HTN\par BP is acceptable 128/88\par Continue current management including low salt diet and regular exercise.\par Continue take amlodipine 5mg po qd\par \par Will RTO for CPE in July.

## 2023-06-05 NOTE — PHYSICAL EXAM
[de-identified] : WDWN in NAD\par HEENT:  unremarkable\par Neck:  supple, no JVD, no LN\par Lungs:  CTA B/L, no W/R/R\par Heart:  Reg rate, +S1S2, no M/R/G\par Abdomen:  soft, NT, ND, +BS, no masses, no HS-megaly\par Genital: No pubic or genital lesions noted.\par Ext:  no C/C/E\par Neuro:  no focal deficits

## 2023-06-05 NOTE — HISTORY OF PRESENT ILLNESS
[FreeTextEntry1] : f/up [de-identified] : SHANICE SHAVER is a 47 year old female with history of HTN, constipation, preDM, vit D deficiency, chronic fatigue, dizziness presented today for allergy.\par She returned to work for [employer] but not to her second job at [employer]. Wearing a wrist brace after the work related injury at [employer].\par Pt reports fully recovered from COVID infection in Feb 2023 after taking Paxlovid. \par \par 1. Pt needs for colonoscopy referral as surveillance. Denies personal/family hx of colon ca. No GI sx. \par 2. AI referral for job. Working at [employer] center where no windows in crowded room. Had severe cough, tearing, allergy reaction at the work. Needs further evaluation. \par 3. Pt requests Rx for breast MRI. Last mammo/ US breast 11/23/22 noted with Birads 2 and recommended breast MR in 6 months. Pt denies breast pain, discharge or lump.\par Denied fever, chills,CP, SOB, abdominal pain, n/v/c/d.

## 2023-07-10 ENCOUNTER — APPOINTMENT (OUTPATIENT)
Dept: ORTHOPEDIC SURGERY | Facility: CLINIC | Age: 48
End: 2023-07-10

## 2023-07-11 ENCOUNTER — APPOINTMENT (OUTPATIENT)
Dept: INTERNAL MEDICINE | Facility: CLINIC | Age: 48
End: 2023-07-11

## 2023-07-20 ENCOUNTER — APPOINTMENT (OUTPATIENT)
Dept: ORTHOPEDIC SURGERY | Facility: CLINIC | Age: 48
End: 2023-07-20

## 2023-07-22 NOTE — ASSESSMENT
[FreeTextEntry1] : SHANICE SHAVER  is a 48 year old  female  with history of HTN, constipation, preDM, vit D deficiency presented today for comprehensive evaluation.\par \par \par -----------------------------------------\par # Health maintenance\par -Pap smear: 7/2019 MRNA alpha result: not detected f/u with Gynecology.\par -Mammogram:  8/2021 Birad 1. Rx given today.\par -Colonoscopy:  Referral made for the first time surveillance colonoscopy\par -Tdap:12/2016  utd\par -COVID vaccine: Fully vaccinated and got 1 booster with all Moderna, last dose in Nov. 2021\par \par # HTN/ atypical chest pain\par Pt  self stopped amlodipine 2.5mg po qd 2 months ago after lost significant body weight but regained pounds.\par BP is 134/82.\par -ECG today: NSR\par Lifestyle modification including regular diet and low salt diet, stress reduction.\par check CBC, CMP today.\par Referred to cardiology for atypical chest pain and strong family hx of CV disease. \par \par # constipation\par High fiber diet such as prune.\par \par f/u lab\par RTC in 6 month for BP or prn.

## 2023-07-22 NOTE — HEALTH RISK ASSESSMENT
[de-identified] : barely has time for exercise [de-identified] : one meal a day [[id number]] : 0 [Change in mental status noted] : No change in mental status noted [Language] : denies difficulty with language [High Risk Behavior] : no high risk behavior [Reports changes in hearing] : Reports no changes in hearing [Reports changes in vision] : Reports no changes in vision [Reports changes in dental health] : Reports no changes in dental health [MammogramComments] : utd [PapSmearComments] : utd [AdvancecareDate] : 07/22

## 2023-07-22 NOTE — HISTORY OF PRESENT ILLNESS
[FreeTextEntry1] : annual [de-identified] : SHANICE SHAVER  is a 48 year old  female  with history of HTN, constipation, preDM, vit D deficiency presented today for comprehensive evaluation. Last seen by Dr. 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 Working at [employer], and study day time. Feeling lots of stress for studying in COVID pandemic era. She felt chest pain 3 days ago and took 2 baby aspirin. The chest pressure stopped in 14min. No dizziness, palpation, diaphoresis happened.She felt CP again yesterday in a few min. The pain had no pattern nor any relation with physical exertion. Family hx: Her father had heart attack and used a pace maker and 4 Uncles had heart attack. Denies CP, palpitation, diaphoresis, SOB, dizziness at this time. She wishes to get ECG today. \par \par Self off amlodipine 2 month as she's eating less.She tried intermittent fasting. Takes only one meal a day at 12 noon-4pm. Lost body weight 20 lbs then regained 18 lbs. Taking 4 Prunes in the morning and it greatly resolved constipation. Denied fever, chills,CP, SOB, abdominal pain, n/v/c/d.

## 2023-07-28 ENCOUNTER — APPOINTMENT (OUTPATIENT)
Dept: INTERNAL MEDICINE | Facility: CLINIC | Age: 48
End: 2023-07-28

## 2023-09-10 ENCOUNTER — TRANSCRIPTION ENCOUNTER (OUTPATIENT)
Age: 48
End: 2023-09-10

## 2023-09-10 ENCOUNTER — EMERGENCY (EMERGENCY)
Facility: HOSPITAL | Age: 48
LOS: 1 days | Discharge: HOME | End: 2023-09-10
Payer: COMMERCIAL

## 2023-09-10 VITALS
SYSTOLIC BLOOD PRESSURE: 145 MMHG | RESPIRATION RATE: 18 BRPM | OXYGEN SATURATION: 100 % | HEIGHT: 59 IN | HEART RATE: 67 BPM | TEMPERATURE: 98 F | DIASTOLIC BLOOD PRESSURE: 94 MMHG | WEIGHT: 145.06 LBS

## 2023-09-10 DIAGNOSIS — S91.203A UNSPECIFIED OPEN WOUND OF UNSPECIFIED GREAT TOE WITH DAMAGE TO NAIL, INITIAL ENCOUNTER: Chronic | ICD-10-CM

## 2023-09-10 DIAGNOSIS — Z90.710 ACQUIRED ABSENCE OF BOTH CERVIX AND UTERUS: Chronic | ICD-10-CM

## 2023-09-10 DIAGNOSIS — Z98.891 HISTORY OF UTERINE SCAR FROM PREVIOUS SURGERY: Chronic | ICD-10-CM

## 2023-09-10 PROCEDURE — 73502 X-RAY EXAM HIP UNI 2-3 VIEWS: CPT

## 2023-09-10 PROCEDURE — 99284 EMERGENCY DEPT VISIT MOD MDM: CPT

## 2023-09-10 PROCEDURE — 99283 EMERGENCY DEPT VISIT LOW MDM: CPT | Mod: 25

## 2023-09-10 PROCEDURE — 73502 X-RAY EXAM HIP UNI 2-3 VIEWS: CPT | Mod: 26,RT

## 2023-09-10 RX ORDER — DEXAMETHASONE 0.5 MG/5ML
6 ELIXIR ORAL ONCE
Refills: 0 | Status: COMPLETED | OUTPATIENT
Start: 2023-09-10 | End: 2023-09-10

## 2023-09-10 RX ORDER — IBUPROFEN 200 MG
1 TABLET ORAL
Qty: 21 | Refills: 0
Start: 2023-09-10 | End: 2023-09-16

## 2023-09-10 RX ORDER — METHOCARBAMOL 500 MG/1
2 TABLET, FILM COATED ORAL
Qty: 30 | Refills: 0
Start: 2023-09-10 | End: 2023-09-14

## 2023-09-10 RX ORDER — AMLODIPINE BESYLATE 5 MG/1
5 TABLET ORAL
Qty: 90 | Refills: 3 | Status: ACTIVE | COMMUNITY
Start: 2019-10-11 | End: 1900-01-01

## 2023-09-10 RX ORDER — IBUPROFEN 200 MG
600 TABLET ORAL ONCE
Refills: 0 | Status: COMPLETED | OUTPATIENT
Start: 2023-09-10 | End: 2023-09-10

## 2023-09-10 RX ORDER — ACETAMINOPHEN 500 MG
975 TABLET ORAL ONCE
Refills: 0 | Status: COMPLETED | OUTPATIENT
Start: 2023-09-10 | End: 2023-09-10

## 2023-09-10 RX ADMIN — Medication 6 MILLIGRAM(S): at 13:33

## 2023-09-10 RX ADMIN — Medication 975 MILLIGRAM(S): at 13:33

## 2023-09-10 RX ADMIN — Medication 600 MILLIGRAM(S): at 11:08

## 2023-09-10 RX ADMIN — Medication 600 MILLIGRAM(S): at 13:33

## 2023-09-10 RX ADMIN — Medication 975 MILLIGRAM(S): at 11:08

## 2023-09-10 NOTE — ED ADULT NURSE NOTE - NSFALLRISKINTERV_ED_ALL_ED
Assistance OOB with selected safe patient handling equipment if applicable/Assistance with ambulation/Communicate fall risk and risk factors to all staff, patient, and family/Monitor gait and stability/Provide visual cue: yellow wristband, yellow gown, etc/Reinforce activity limits and safety measures with patient and family/Call bell, personal items and telephone in reach/Instruct patient to call for assistance before getting out of bed/chair/stretcher/Non-slip footwear applied when patient is off stretcher/[place] to call system/Physically safe environment - no spills, clutter or unnecessary equipment/Purposeful Proactive Rounding/Room/bathroom lighting operational, light cord in reach

## 2023-09-10 NOTE — ED PROVIDER NOTE - NSFOLLOWUPINSTRUCTIONS_ED_ALL_ED_FT
HIP IMPINGEMENT    Hip Impingement is a disorder of the hip caused by premature contact between  the thigh bone and the hip socket. Pain most commonly occurs in the groin and the side of the hip,  however can also present in the buttock and the front of the thigh (amongst other locations). Other  symptoms include catching, locking, stiffness, restricted range of motion or giving way.    Ways to help reduce pain:  Activities/hip positions to avoid when symptoms are irritable:  • Prolonged sitting, standing and walking.  • Deep squats.  • Rotational/pivoting movements eg Martial arts/tennis.    Pain relief:  • A short course of analgesia (i.e. acetaminophen_  • Non-steroidal anti-inflammatory drugs (NSAIDS) such as ibuprofen are recommended for pain relief    Exercise and physiotherapy:  There is good evidence that physiotherapy can help improve hip symptoms through a targeted  exercise based program. This is aimed to improve hip movement / control around the hip through  strengthening of the deep hip muscles in particular. The deep muscles are important in controlling the  finer movements in the hip that allow controlled movement and help the superficial hip muscles from  having to overcompensate    Overall, you should advance activity as tolerated.  Continue all previously prescribed medications as directed unless otherwise instructed.  Follow up with your primary care physician in 48-72 hours- bring copies of your results.  Return to the ER for worsening or persistent symptoms, and/or ANY NEW OR CONCERNING SYMPTOMS. If you have issues obtaining follow up, please call: [phone] to obtain a doctor or specialist who takes your insurance in your area.  You may call [phone] to make an appointment with the internal medicine clinic.    Follow up with your primary Orthopedist, Dr. Donnelly, for re-evaluation of this condition.    Jose Donnelly  Orthopaedic Surgery  [address] [place] [address], [address]  [place] [address]  Phone: [phone]  Fax: [phone]  Follow Up Time: 4-6 Days HIP IMPINGEMENT    Hip Impingement is a disorder of the hip caused by premature contact between  the thigh bone and the hip socket. Pain most commonly occurs in the groin and the side of the hip,  however can also present in the buttock and the front of the thigh (amongst other locations). Other  symptoms include catching, locking, stiffness, restricted range of motion or giving way.    Ways to help reduce pain:  Activities/hip positions to avoid when symptoms are irritable:  • Prolonged sitting, standing and walking.  • Deep squats.  • Rotational/pivoting movements eg Martial arts/tennis.    Pain relief:  • A short course of analgesia (i.e. acetaminophen_  • Non-steroidal anti-inflammatory drugs (NSAIDS) such as ibuprofen are recommended for pain relief    Exercise and physiotherapy:  There is good evidence that physiotherapy can help improve hip symptoms through a targeted  exercise based program. This is aimed to improve hip movement / control around the hip through  strengthening of the deep hip muscles in particular. The deep muscles are important in controlling the  finer movements in the hip that allow controlled movement and help the superficial hip muscles from  having to overcompensate    Overall, you should advance activity as tolerated.  Continue all previously prescribed medications as directed unless otherwise instructed.  Follow up with your primary care physician in 48-72 hours- bring copies of your results.  Return to the ER for worsening or persistent symptoms, and/or ANY NEW OR CONCERNING SYMPTOMS. If you have issues obtaining follow up, please call: [phone] to obtain a doctor or specialist who takes your insurance in your area.  You may call [phone] to make an appointment with the internal medicine clinic.    Follow up with your primary Orthopedist, Dr. Donnelly, for re-evaluation of this condition.    Jose Donnelly  Orthopaedic Surgery  [address] [facility], [address]  [place] [address]  Phone: [phone]  Fax: [phone]  Follow Up Time: 4-6 Days    Take Ibuprofen and Robaxin as prescribed for pain.  Take Tylenol (2 tablets of 500mg every 8hours) as needed for pain.

## 2023-09-10 NOTE — ED PROVIDER NOTE - PHYSICAL EXAMINATION
Vital signs: Within normal limits  Gen: Uncomfortable appearing in NAD.  Head:  NC/AT.  Resp: No distress.  Ext: no deformities, decreased range of motion to hip flexion and external rotation secondary to pain.  Patient has 0 pain with forceful axial load.  Skin: warm and dry as visualized.  Neuro: alert and oriented to person, place, time.

## 2023-09-10 NOTE — ED ADULT NURSE NOTE - NSFALLOOBATTEMPT_ED_ALL_ED
PAST SURGICAL HISTORY:  History of open heart surgery     Other postprocedural status Right Shoulder    Other postprocedural status S/P right knee arthroscopy    
No

## 2023-09-10 NOTE — ED PROVIDER NOTE - OBJECTIVE STATEMENT
48-year-old female complaining of 24 hours right hip pain.  Onset yesterday and gradually worsening over time.  Worse with hip flexion and external rotation.  No pain with axial load.  Associated symptoms no fevers no chills, no rash.  Location right lateral hip soft tissue.    Vital signs: Within normal limits  Gen: Uncomfortable appearing in NAD.  Head:  NC/AT.  Resp: No distress.  Ext: no deformities, decreased range of motion to hip flexion and external rotation secondary to pain.  Patient has 0 pain with forceful axial load.  Skin: warm and dry as visualized.  Neuro: alert and oriented to person, place, time.

## 2023-09-10 NOTE — ED PROVIDER NOTE - ATTENDING APP SHARED VISIT CONTRIBUTION OF CARE
MD Mckenna:  patient seen and evaluated with the Medical Student and NP  I was present for key portions of the History & Physical, and I agree with the Impression & Plan.    48-year-old female complaining of 24 hours right hip pain.  Onset yesterday and gradually worsening over time.  Worse with hip flexion and external rotation.  No pain with axial load.  Associated symptoms no fevers no chills, no rash.  Location right lateral hip soft tissue.    Vital signs: Within normal limits  Gen: Uncomfortable appearing in NAD.  Head:  NC/AT.  Resp: No distress.  Ext: no deformities, decreased range of motion to hip flexion and external rotation secondary to pain.  Patient has 0 pain with forceful axial load.  Skin: warm and dry as visualized.  Neuro: alert and oriented to person, place, time.    Medical decision makin-year-old female right lateral soft tissue hip pain.  Patient has had this pain before years ago, and was told by her orthopedic surgeon that it was hip impingement syndrome.  Patient feels like she might of stressed her hip during a left at work and provoked the symptoms.    There are no associated neurologic or vascular symptoms that would necessitate arterial venous imaging at this time.    There is no associated fevers or chills that suggest systemic infection or septic joint at this time.  Basic labs including sed rate CRP are not indicated at this time.    We will administer NSAIDs, x-ray to evaluate hip anatomy and reassess.

## 2023-09-10 NOTE — ED PROVIDER NOTE - ATTENDING CONTRIBUTION TO CARE
MD Mckenna:  patient seen and evaluated with the Medical Student.   I was present for key portions of the History & Physical, and I agree with the Impression & Plan.    48-year-old female complaining of 24 hours right hip pain.  Onset yesterday and gradually worsening over time.  Worse with hip flexion and external rotation.  No pain with axial load.  Associated symptoms no fevers no chills, no rash.  Location right lateral hip soft tissue.    Vital signs: Within normal limits  Gen: Uncomfortable appearing in NAD.  Head:  NC/AT.  Resp: No distress.  Ext: no deformities, decreased range of motion to hip flexion and external rotation secondary to pain.  Patient has 0 pain with forceful axial load.  Skin: warm and dry as visualized.  Neuro: alert and oriented to person, place, time.    Medical decision makin-year-old female right lateral soft tissue hip pain.  Patient has had this pain before years ago, and was told by her orthopedic surgeon that it was hip impingement syndrome.  Patient feels like she might of stressed her hip during a left at work and provoked the symptoms.    There are no associated neurologic or vascular symptoms that would necessitate arterial venous imaging at this time.    There is no associated fevers or chills that suggest systemic infection or septic joint at this time.  Basic labs including sed rate CRP are not indicated at this time.    We will administer NSAIDs, x-ray to evaluate hip anatomy and reassess.

## 2023-09-10 NOTE — ED PROVIDER NOTE - NS ED ATTENDING STATEMENT MOD
Attending with This was a shared visit with the PALLAVI. I reviewed and verified the documentation and independently performed the documented:

## 2023-09-10 NOTE — ED ADULT NURSE NOTE - NSICDXPASTSURGICALHX_GEN_ALL_CORE_FT
PAST SURGICAL HISTORY:  H/O  section     S/P ANGELITO (total abdominal hysterectomy) fibroids     S/P tooth extraction ' 2009    [place] teeth X2    Traumatic loss of toenail of great toe excision  right side 2018

## 2023-09-10 NOTE — ED PROVIDER NOTE - PATIENT PORTAL LINK FT
You can access the FollowMyHealth Patient Portal offered by [facility] by registering at the following website: [email or web address]. By joining [employer]’s FollowMyHealth portal, you will also be able to view your health information using other applications (apps) compatible with our system.

## 2023-09-10 NOTE — ED PROVIDER NOTE - CLINICAL SUMMARY MEDICAL DECISION MAKING FREE TEXT BOX
Medical decision makin-year-old female right lateral soft tissue hip pain.  Patient has had this pain before years ago, and was told by her orthopedic surgeon that it was hip impingement syndrome.  Patient feels like she might of stressed her hip during a left at work and provoked the symptoms.    There are no associated neurologic or vascular symptoms that would necessitate arterial venous imaging at this time.    There is no associated fevers or chills that suggest systemic infection or septic joint at this time.  Basic labs including sed rate CRP are not indicated at this time.    We will administer NSAIDs, x-ray to evaluate hip anatomy and reassess.

## 2023-09-10 NOTE — ED PROVIDER NOTE - CARE PROVIDER_API CALL
Jose Donnelly  Orthopaedic Surgery  [address], [address]  [place] [address]  Phone: [phone]  Fax: [phone]  Follow Up Time: 4-6 Days

## 2023-09-10 NOTE — ED PROVIDER NOTE - CROS ED MUSC ALL NEG
- - - PAST MEDICAL HISTORY:  Asthma     Cervical Radiculopathy     GERD (Gastroesophageal Reflux Disease)     Lumbar Radiculopathy

## 2023-09-10 NOTE — ED PROVIDER NOTE - PROGRESS NOTE DETAILS
Pt states feeling improved but still had the pain with movement. N/V- intact. No focal neuro deficit. Will f/u with her ortho Dr. Coto for reevaluation in this week.

## 2023-09-10 NOTE — ED ADULT NURSE NOTE - OBJECTIVE STATEMENT
48 year old female pt presented to the ED co right hip pain pt states h/o right hip impingement states acute onset of pain yesterday pt denies numbness or tingling when ambulating , pt denies any trauma

## 2023-09-10 NOTE — ED PROVIDER NOTE - NSICDXPASTSURGICALHX_GEN_ALL_CORE_FT
PAST SURGICAL HISTORY:  H/O  section     S/P ANGELITO (total abdominal hysterectomy) fibroids     S/P tooth extraction ' 2009    [place] teeth X2    Traumatic loss of toenail of great toe excision  right side 2018

## 2023-09-10 NOTE — ED PROVIDER NOTE - IV ALTEPLASE DOOR HIDDEN
show Topical Ketoconazole Counseling: Patient counseled that this medication may cause skin irritation or allergic reactions.  In the event of skin irritation, the patient was advised to reduce the amount of the drug applied or use it less frequently.   The patient verbalized understanding of the proper use and possible adverse effects of ketoconazole.  All of the patient's questions and concerns were addressed.

## 2023-09-10 NOTE — ED ADULT TRIAGE NOTE - CHIEF COMPLAINT QUOTE
R Hip pain atraumatic in nature, now radiating down leg and up to right shoulder   Hx of hip "impingement"

## 2023-09-14 ENCOUNTER — APPOINTMENT (OUTPATIENT)
Dept: ORTHOPEDIC SURGERY | Facility: CLINIC | Age: 48
End: 2023-09-14
Payer: COMMERCIAL

## 2023-09-14 VITALS — WEIGHT: 145 LBS | BODY MASS INDEX: 29.23 KG/M2 | HEIGHT: 59 IN

## 2023-09-14 PROCEDURE — 73502 X-RAY EXAM HIP UNI 2-3 VIEWS: CPT | Mod: RT

## 2023-09-14 PROCEDURE — 99213 OFFICE O/P EST LOW 20 MIN: CPT

## 2023-09-19 NOTE — PHYSICAL EXAM
Home [de-identified] : TEB. General: Well-developed well-nourished in no apparent distress. Appears mildly ill. \par Respiratory: Speaking in complete sentences, no respiratory distress noted.\par Neuro: No focal deficits noted.\par

## 2023-10-01 PROBLEM — M54.16 LUMBAR RADICULAR PAIN: Status: ACTIVE | Noted: 2020-08-26

## 2023-10-13 ENCOUNTER — APPOINTMENT (OUTPATIENT)
Age: 48
End: 2023-10-13
Payer: COMMERCIAL

## 2023-10-13 ENCOUNTER — OUTPATIENT (OUTPATIENT)
Dept: OUTPATIENT SERVICES | Facility: HOSPITAL | Age: 48
LOS: 1 days | End: 2023-10-13
Payer: COMMERCIAL

## 2023-10-13 VITALS
WEIGHT: 148 LBS | BODY MASS INDEX: 29.84 KG/M2 | HEART RATE: 68 BPM | OXYGEN SATURATION: 99 % | SYSTOLIC BLOOD PRESSURE: 120 MMHG | HEIGHT: 59 IN | DIASTOLIC BLOOD PRESSURE: 76 MMHG

## 2023-10-13 DIAGNOSIS — Z98.891 HISTORY OF UTERINE SCAR FROM PREVIOUS SURGERY: Chronic | ICD-10-CM

## 2023-10-13 DIAGNOSIS — M25.559 PAIN IN UNSPECIFIED HIP: ICD-10-CM

## 2023-10-13 DIAGNOSIS — S91.203A UNSPECIFIED OPEN WOUND OF UNSPECIFIED GREAT TOE WITH DAMAGE TO NAIL, INITIAL ENCOUNTER: Chronic | ICD-10-CM

## 2023-10-13 DIAGNOSIS — Z90.710 ACQUIRED ABSENCE OF BOTH CERVIX AND UTERUS: Chronic | ICD-10-CM

## 2023-10-13 DIAGNOSIS — I10 ESSENTIAL (PRIMARY) HYPERTENSION: ICD-10-CM

## 2023-10-13 PROCEDURE — 99213 OFFICE O/P EST LOW 20 MIN: CPT

## 2023-10-13 PROCEDURE — G0463: CPT

## 2023-11-27 ENCOUNTER — APPOINTMENT (OUTPATIENT)
Dept: GASTROENTEROLOGY | Facility: CLINIC | Age: 48
End: 2023-11-27
Payer: COMMERCIAL

## 2023-11-27 VITALS
WEIGHT: 145 LBS | HEART RATE: 74 BPM | OXYGEN SATURATION: 99 % | BODY MASS INDEX: 29.23 KG/M2 | HEIGHT: 59 IN | DIASTOLIC BLOOD PRESSURE: 85 MMHG | SYSTOLIC BLOOD PRESSURE: 145 MMHG

## 2023-11-27 DIAGNOSIS — Z12.11 ENCOUNTER FOR SCREENING FOR MALIGNANT NEOPLASM OF COLON: ICD-10-CM

## 2023-11-27 PROCEDURE — 99203 OFFICE O/P NEW LOW 30 MIN: CPT

## 2023-11-27 RX ORDER — SODIUM SULFATE, POTASSIUM SULFATE AND MAGNESIUM SULFATE 1.6; 3.13; 17.5 G/177ML; G/177ML; G/177ML
17.5-3.13-1.6 SOLUTION ORAL
Qty: 1 | Refills: 0 | Status: ACTIVE | COMMUNITY
Start: 2023-11-27 | End: 1900-01-01

## 2023-12-13 ENCOUNTER — APPOINTMENT (OUTPATIENT)
Dept: ULTRASOUND IMAGING | Facility: IMAGING CENTER | Age: 48
End: 2023-12-13

## 2023-12-13 ENCOUNTER — APPOINTMENT (OUTPATIENT)
Dept: MAMMOGRAPHY | Facility: IMAGING CENTER | Age: 48
End: 2023-12-13

## 2023-12-13 ENCOUNTER — APPOINTMENT (OUTPATIENT)
Dept: MRI IMAGING | Facility: IMAGING CENTER | Age: 48
End: 2023-12-13

## 2023-12-22 ENCOUNTER — APPOINTMENT (OUTPATIENT)
Dept: INTERNAL MEDICINE | Facility: CLINIC | Age: 48
End: 2023-12-22

## 2023-12-29 ENCOUNTER — OUTPATIENT (OUTPATIENT)
Dept: OUTPATIENT SERVICES | Facility: HOSPITAL | Age: 48
LOS: 1 days | End: 2023-12-29
Payer: COMMERCIAL

## 2023-12-29 ENCOUNTER — NON-APPOINTMENT (OUTPATIENT)
Age: 48
End: 2023-12-29

## 2023-12-29 ENCOUNTER — APPOINTMENT (OUTPATIENT)
Dept: INTERNAL MEDICINE | Facility: CLINIC | Age: 48
End: 2023-12-29
Payer: COMMERCIAL

## 2023-12-29 DIAGNOSIS — G43.909 MIGRAINE, UNSPECIFIED, NOT INTRACTABLE, W/OUT STATUS MIGRAINOSUS: ICD-10-CM

## 2023-12-29 DIAGNOSIS — S91.203A UNSPECIFIED OPEN WOUND OF UNSPECIFIED GREAT TOE WITH DAMAGE TO NAIL, INITIAL ENCOUNTER: Chronic | ICD-10-CM

## 2023-12-29 DIAGNOSIS — Z90.710 ACQUIRED ABSENCE OF BOTH CERVIX AND UTERUS: Chronic | ICD-10-CM

## 2023-12-29 DIAGNOSIS — Z98.891 HISTORY OF UTERINE SCAR FROM PREVIOUS SURGERY: Chronic | ICD-10-CM

## 2023-12-29 DIAGNOSIS — I10 ESSENTIAL (PRIMARY) HYPERTENSION: ICD-10-CM

## 2023-12-29 PROCEDURE — 99443: CPT

## 2023-12-29 PROCEDURE — G0463: CPT

## 2023-12-29 NOTE — HISTORY OF PRESENT ILLNESS
[Home] : at home, [unfilled] , at the time of the visit. 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  She needs a note for work whenever she is unable to go in.  She is otherwise feeling better and has been back to work already

## 2024-01-09 DIAGNOSIS — G43.909 MIGRAINE, UNSPECIFIED, NOT INTRACTABLE, WITHOUT STATUS MIGRAINOSUS: ICD-10-CM

## 2024-02-09 ENCOUNTER — APPOINTMENT (OUTPATIENT)
Dept: GASTROENTEROLOGY | Facility: HOSPITAL | Age: 49
End: 2024-02-09

## 2024-02-09 ENCOUNTER — RESULT REVIEW (OUTPATIENT)
Age: 49
End: 2024-02-09

## 2024-02-09 ENCOUNTER — TRANSCRIPTION ENCOUNTER (OUTPATIENT)
Age: 49
End: 2024-02-09

## 2024-02-09 ENCOUNTER — OUTPATIENT (OUTPATIENT)
Dept: OUTPATIENT SERVICES | Facility: HOSPITAL | Age: 49
LOS: 1 days | End: 2024-02-09
Payer: COMMERCIAL

## 2024-02-09 VITALS
SYSTOLIC BLOOD PRESSURE: 126 MMHG | HEART RATE: 68 BPM | WEIGHT: 145.06 LBS | RESPIRATION RATE: 18 BRPM | TEMPERATURE: 98 F | OXYGEN SATURATION: 100 % | DIASTOLIC BLOOD PRESSURE: 58 MMHG | HEIGHT: 55 IN

## 2024-02-09 VITALS
OXYGEN SATURATION: 100 % | DIASTOLIC BLOOD PRESSURE: 64 MMHG | RESPIRATION RATE: 20 BRPM | SYSTOLIC BLOOD PRESSURE: 111 MMHG | HEART RATE: 69 BPM

## 2024-02-09 DIAGNOSIS — Z12.11 ENCOUNTER FOR SCREENING FOR MALIGNANT NEOPLASM OF COLON: ICD-10-CM

## 2024-02-09 DIAGNOSIS — Z90.710 ACQUIRED ABSENCE OF BOTH CERVIX AND UTERUS: Chronic | ICD-10-CM

## 2024-02-09 DIAGNOSIS — Z98.891 HISTORY OF UTERINE SCAR FROM PREVIOUS SURGERY: Chronic | ICD-10-CM

## 2024-02-09 DIAGNOSIS — S91.203A UNSPECIFIED OPEN WOUND OF UNSPECIFIED GREAT TOE WITH DAMAGE TO NAIL, INITIAL ENCOUNTER: Chronic | ICD-10-CM

## 2024-02-09 PROCEDURE — 45385 COLONOSCOPY W/LESION REMOVAL: CPT | Mod: PT

## 2024-02-09 PROCEDURE — 45385 COLONOSCOPY W/LESION REMOVAL: CPT | Mod: GC

## 2024-02-09 PROCEDURE — 45380 COLONOSCOPY AND BIOPSY: CPT | Mod: PT,XS

## 2024-02-09 PROCEDURE — 88305 TISSUE EXAM BY PATHOLOGIST: CPT | Mod: 26

## 2024-02-09 PROCEDURE — 88305 TISSUE EXAM BY PATHOLOGIST: CPT

## 2024-02-09 DEVICE — NET RETRV ROT ROTH 2.5MMX230CM: Type: IMPLANTABLE DEVICE | Status: FUNCTIONAL

## 2024-02-09 RX ORDER — SODIUM CHLORIDE 9 MG/ML
500 INJECTION INTRAMUSCULAR; INTRAVENOUS; SUBCUTANEOUS
Refills: 0 | Status: COMPLETED | OUTPATIENT
Start: 2024-02-09 | End: 2024-02-09

## 2024-02-09 RX ADMIN — SODIUM CHLORIDE 30 MILLILITER(S): 9 INJECTION INTRAMUSCULAR; INTRAVENOUS; SUBCUTANEOUS at 10:00

## 2024-02-09 NOTE — ASU PATIENT PROFILE, ADULT - FALL HARM RISK - UNIVERSAL INTERVENTIONS
Bed in lowest position, wheels locked, appropriate side rails in place/Call bell, personal items and telephone in reach/Instruct patient to call for assistance before getting out of bed or chair/Non-slip footwear when patient is out of bed/[place] to call system/Physically safe environment - no spills, clutter or unnecessary equipment/Purposeful Proactive Rounding/Room/bathroom lighting operational, light cord in reach
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
## 2024-02-09 NOTE — ASU PATIENT PROFILE, ADULT - NSICDXPASTSURGICALHX_GEN_ALL_CORE_FT
PAST SURGICAL HISTORY:  H/O  section     S/P ANGELITO (total abdominal hysterectomy) fibroids     S/P tooth extraction ' 2009    [place] teeth X2    Traumatic loss of toenail of great toe excision  right side 2018

## 2024-02-09 NOTE — PRE PROCEDURE NOTE - PRE PROCEDURE EVALUATION
Attending Physician:             Zeinab Courtney               Procedure: colonoscopy    Indication for Procedure: colon cancer screening  ________________________________________________________  PAST MEDICAL & SURGICAL HISTORY:  Fibroid uterus  h/o ANGELITO       Migraine      HTN (hypertension)  last treated       Soft tissue tumor, benign  right side of scalp      Environmental allergies      S/P tooth extraction  '     [place] teeth X2      H/O  section        S/P ANGELITO (total abdominal hysterectomy)  fibroids       Traumatic loss of toenail of great toe  excision  right side 2018        ALLERGIES:  No Known Allergies    HOME MEDICATIONS:  pt denies any medications past month:     AICD/PPM: [ ] yes   [ ] no    PERTINENT LAB DATA:                      PHYSICAL EXAMINATION:    T(C): --  HR: --  BP: --  RR: --  SpO2: --    Constitutional: NAD  HEENT: PERRLA, EOMI,    Neck:  No JVD  Respiratory: CTAB/L  Cardiovascular: S1 and S2  Gastrointestinal: BS+, soft, NT/ND  Extremities: No peripheral edema  Neurological: A/O x 3, no focal deficits  Psychiatric: Normal mood, normal affect  Skin: No rashes    ASA Class: I [ ]  II [ ]  III [ ]  IV [ ]    COMMENTS:    The patient is a suitable candidate for the planned procedure unless box checked [ ]  No, explain:

## 2024-02-09 NOTE — ASU DISCHARGE PLAN (ADULT/PEDIATRIC) - NS MD DC FALL RISK RISK
For information on Fall & Injury Prevention, visit: [email or web address] OR  [email or web address] OR  https://www.cdc.gov/steadi/patient.html

## 2024-02-14 ENCOUNTER — NON-APPOINTMENT (OUTPATIENT)
Age: 49
End: 2024-02-14

## 2024-02-15 LAB — SURGICAL PATHOLOGY STUDY: SIGNIFICANT CHANGE UP

## 2024-03-08 ENCOUNTER — APPOINTMENT (OUTPATIENT)
Dept: INTERNAL MEDICINE | Facility: CLINIC | Age: 49
End: 2024-03-08
Payer: COMMERCIAL

## 2024-03-08 ENCOUNTER — OUTPATIENT (OUTPATIENT)
Dept: OUTPATIENT SERVICES | Facility: HOSPITAL | Age: 49
LOS: 1 days | End: 2024-03-08
Payer: COMMERCIAL

## 2024-03-08 VITALS
HEART RATE: 77 BPM | SYSTOLIC BLOOD PRESSURE: 130 MMHG | HEIGHT: 59 IN | DIASTOLIC BLOOD PRESSURE: 80 MMHG | WEIGHT: 151 LBS | BODY MASS INDEX: 30.44 KG/M2 | OXYGEN SATURATION: 98 %

## 2024-03-08 DIAGNOSIS — R73.03 PREDIABETES.: ICD-10-CM

## 2024-03-08 DIAGNOSIS — I10 ESSENTIAL (PRIMARY) HYPERTENSION: ICD-10-CM

## 2024-03-08 DIAGNOSIS — Z90.710 ACQUIRED ABSENCE OF BOTH CERVIX AND UTERUS: Chronic | ICD-10-CM

## 2024-03-08 DIAGNOSIS — Z00.00 ENCOUNTER FOR GENERAL ADULT MEDICAL EXAMINATION W/OUT ABNORMAL FINDINGS: ICD-10-CM

## 2024-03-08 DIAGNOSIS — S91.203A UNSPECIFIED OPEN WOUND OF UNSPECIFIED GREAT TOE WITH DAMAGE TO NAIL, INITIAL ENCOUNTER: Chronic | ICD-10-CM

## 2024-03-08 DIAGNOSIS — Z98.891 HISTORY OF UTERINE SCAR FROM PREVIOUS SURGERY: Chronic | ICD-10-CM

## 2024-03-08 LAB
ALBUMIN SERPL ELPH-MCNC: 4.6 G/DL
ALP BLD-CCNC: 66 U/L
ALT SERPL-CCNC: 24 U/L
ANION GAP SERPL CALC-SCNC: 13 MMOL/L
AST SERPL-CCNC: 23 U/L
BILIRUB SERPL-MCNC: 0.3 MG/DL
BUN SERPL-MCNC: 8 MG/DL
CALCIUM SERPL-MCNC: 9.3 MG/DL
CHLORIDE SERPL-SCNC: 100 MMOL/L
CHOLEST SERPL-MCNC: 224 MG/DL
CO2 SERPL-SCNC: 26 MMOL/L
CREAT SERPL-MCNC: 0.75 MG/DL
EGFR: 97 ML/MIN/1.73M2
ESTIMATED AVERAGE GLUCOSE: 117 MG/DL
GLUCOSE SERPL-MCNC: 93 MG/DL
HBA1C MFR BLD HPLC: 5.7 %
HCT VFR BLD CALC: 37 %
HDLC SERPL-MCNC: 75 MG/DL
HGB BLD-MCNC: 11.6 G/DL
LDLC SERPL CALC-MCNC: 138 MG/DL
MCHC RBC-ENTMCNC: 27.6 PG
MCHC RBC-ENTMCNC: 31.4 GM/DL
MCV RBC AUTO: 87.9 FL
NONHDLC SERPL-MCNC: 149 MG/DL
PLATELET # BLD AUTO: 405 K/UL
POTASSIUM SERPL-SCNC: 4.4 MMOL/L
PROT SERPL-MCNC: 7.2 G/DL
RBC # BLD: 4.21 M/UL
RBC # FLD: 13.8 %
SODIUM SERPL-SCNC: 138 MMOL/L
TRIGL SERPL-MCNC: 63 MG/DL
WBC # FLD AUTO: 5.33 K/UL

## 2024-03-08 PROCEDURE — G0463: CPT

## 2024-03-08 PROCEDURE — 99396 PREV VISIT EST AGE 40-64: CPT

## 2024-03-08 RX ORDER — NIRMATRELVIR AND RITONAVIR 300-100 MG
20 X 150 MG & KIT ORAL
Qty: 30 | Refills: 0 | Status: COMPLETED | COMMUNITY
Start: 2023-02-12 | End: 2024-03-08

## 2024-03-08 NOTE — HEALTH RISK ASSESSMENT
[Good] : ~his/her~  mood as  good [Yes] : Yes [Monthly or less (1 pt)] : Monthly or less (1 point) [No] : In the past 12 months have you used drugs other than those required for medical reasons? No [No falls in past year] : Patient reported no falls in the past year [0] : 2) Feeling down, depressed, or hopeless: Not at all (0) [PHQ-2 Negative - No further assessment needed] : PHQ-2 Negative - No further assessment needed [Audit-CScore] : 1 [de-identified] : barely has time for exercise [de-identified] : one meal a day [[id number]] : 0 [Patient reported mammogram was normal] : Patient reported mammogram was normal [Patient reported PAP Smear was normal] : Patient reported PAP Smear was normal [Patient reported colonoscopy was abnormal] : Patient reported colonoscopy was abnormal [Change in mental status noted] : No change in mental status noted [Language] : denies difficulty with language [None] : None [With Family] : lives with family [Employed] : employed [] :  [Sexually Active] : sexually active [High Risk Behavior] : no high risk behavior [Feels Safe at Home] : Feels safe at home [Fully functional (bathing, dressing, toileting, transferring, walking, feeding)] : Fully functional (bathing, dressing, toileting, transferring, walking, feeding) [Fully functional (using the telephone, shopping, preparing meals, housekeeping, doing laundry, using] : Fully functional and needs no help or supervision to perform IADLs (using the telephone, shopping, preparing meals, housekeeping, doing laundry, using transportation, managing medications and managing finances) [Reports changes in hearing] : Reports no changes in hearing [Reports changes in vision] : Reports no changes in vision [Reports changes in dental health] : Reports no changes in dental health [Carbon Monoxide Detector] : carbon monoxide detector [Smoke Detector] : smoke detector [Sunscreen] : uses sunscreen [Seat Belt] :  uses seat belt [MammogramComments] : order given = due [MammogramDate] : 10/22 [PapSmearComments] : utd [ColonoscopyDate] : 02/24 [PapSmearDate] : 08/22 [ColonoscopyComments] : one TA, one hyperplastic polyp [Patient/Caregiver not ready to engage] : , patient/caregiver not ready to engage [AdvancecareDate] : 07/22 [Never] : Never

## 2024-03-08 NOTE — HISTORY OF PRESENT ILLNESS
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  Feeling well, little bothering her.  Denies CP, palpitation, diaphoresis, SOB.  Tries to help herself w weight/portions/exercise.  Feels like she has more muscle than fat than the last year, although weight up a few pounds.  Has DM in fam hx/father.

## 2024-03-08 NOTE — COUNSELING
[Benefits of weight loss discussed] : Benefits of weight loss discussed [Encouraged to increase physical activity] : Encouraged to increase physical activity [None] : None [____ min/wk Activity] : [unfilled] min/wk activity [Good understanding] : Patient has a good understanding of lifestyle changes and steps needed to achieve self management goal

## 2024-03-08 NOTE — PHYSICAL EXAM
[No Acute Distress] : no acute distress [Well Developed] : well developed [Well Nourished] : well nourished [Well-Appearing] : well-appearing [Normal Sclera/Conjunctiva] : normal sclera/conjunctiva [PERRL] : pupils equal round and reactive to light [EOMI] : extraocular movements intact [Normal Oropharynx] : the oropharynx was normal [Normal Outer Ear/Nose] : the outer ears and nose were normal in appearance [No JVD] : no jugular venous distention [No Lymphadenopathy] : no lymphadenopathy [Thyroid Normal, No Nodules] : the thyroid was normal and there were no nodules present [Supple] : supple [No Accessory Muscle Use] : no accessory muscle use [Clear to Auscultation] : lungs were clear to auscultation bilaterally [No Respiratory Distress] : no respiratory distress  [Regular Rhythm] : with a regular rhythm [Normal Rate] : normal rate  [Normal S1, S2] : normal S1 and S2 [No Murmur] : no murmur heard [No Carotid Bruits] : no carotid bruits [No Varicosities] : no varicosities [No Abdominal Bruit] : a ~M bruit was not heard ~T in the abdomen [No Edema] : there was no peripheral edema [No Palpable Aorta] : no palpable aorta [Pedal Pulses Present] : the pedal pulses are present [Normal Appearance] : normal in appearance [No Extremity Clubbing/Cyanosis] : no extremity clubbing/cyanosis [No Nipple Discharge] : no nipple discharge [Non Tender] : non-tender [Soft] : abdomen soft [No Axillary Lymphadenopathy] : no axillary lymphadenopathy [Non-distended] : non-distended [No Masses] : no abdominal mass palpated [No HSM] : no HSM [Normal Supraclavicular Nodes] : no supraclavicular lymphadenopathy [Normal Bowel Sounds] : normal bowel sounds [Normal Posterior Cervical Nodes] : no posterior cervical lymphadenopathy [Normal Anterior Cervical Nodes] : no anterior cervical lymphadenopathy [No Spinal Tenderness] : no spinal tenderness [No CVA Tenderness] : no CVA  tenderness [No Joint Swelling] : no joint swelling [Grossly Normal Strength/Tone] : grossly normal strength/tone [No Rash] : no rash [No Skin Lesions] : no skin lesions [Coordination Grossly Intact] : coordination grossly intact [No Focal Deficits] : no focal deficits [Normal Gait] : normal gait [Deep Tendon Reflexes (DTR)] : deep tendon reflexes were 2+ and symmetric [Normal Affect] : the affect was normal [Normal Insight/Judgement] : insight and judgment were intact [de-identified] : no suspicious nevi

## 2024-03-19 DIAGNOSIS — R73.03 PREDIABETES: ICD-10-CM

## 2024-03-19 DIAGNOSIS — Z00.00 ENCOUNTER FOR GENERAL ADULT MEDICAL EXAMINATION WITHOUT ABNORMAL FINDINGS: ICD-10-CM

## 2024-07-16 ENCOUNTER — EMERGENCY (EMERGENCY)
Facility: HOSPITAL | Age: 49
LOS: 1 days | Discharge: HOME | End: 2024-07-16
Attending: STUDENT IN AN ORGANIZED HEALTH CARE EDUCATION/TRAINING PROGRAM
Payer: COMMERCIAL

## 2024-07-16 VITALS
TEMPERATURE: 98 F | SYSTOLIC BLOOD PRESSURE: 149 MMHG | WEIGHT: 141.98 LBS | DIASTOLIC BLOOD PRESSURE: 86 MMHG | HEIGHT: 59 IN | OXYGEN SATURATION: 96 % | HEART RATE: 68 BPM | RESPIRATION RATE: 18 BRPM

## 2024-07-16 DIAGNOSIS — Z98.891 HISTORY OF UTERINE SCAR FROM PREVIOUS SURGERY: Chronic | ICD-10-CM

## 2024-07-16 DIAGNOSIS — S91.203A UNSPECIFIED OPEN WOUND OF UNSPECIFIED GREAT TOE WITH DAMAGE TO NAIL, INITIAL ENCOUNTER: Chronic | ICD-10-CM

## 2024-07-16 DIAGNOSIS — Z90.710 ACQUIRED ABSENCE OF BOTH CERVIX AND UTERUS: Chronic | ICD-10-CM

## 2024-07-16 PROCEDURE — 99285 EMERGENCY DEPT VISIT HI MDM: CPT

## 2024-07-17 VITALS
SYSTOLIC BLOOD PRESSURE: 133 MMHG | TEMPERATURE: 98 F | DIASTOLIC BLOOD PRESSURE: 68 MMHG | OXYGEN SATURATION: 99 % | RESPIRATION RATE: 16 BRPM | HEART RATE: 62 BPM

## 2024-07-17 LAB
ALBUMIN SERPL ELPH-MCNC: 4.3 G/DL — SIGNIFICANT CHANGE UP (ref 3.3–5)
ALP SERPL-CCNC: 59 U/L — SIGNIFICANT CHANGE UP (ref 40–120)
ALT FLD-CCNC: 9 U/L — LOW (ref 10–45)
ANION GAP SERPL CALC-SCNC: 14 MMOL/L — SIGNIFICANT CHANGE UP (ref 5–17)
AST SERPL-CCNC: 14 U/L — SIGNIFICANT CHANGE UP (ref 10–40)
BASE EXCESS BLDV CALC-SCNC: 2.2 MMOL/L — SIGNIFICANT CHANGE UP (ref -2–3)
BASOPHILS # BLD AUTO: 0.02 K/UL — SIGNIFICANT CHANGE UP (ref 0–0.2)
BASOPHILS NFR BLD AUTO: 0.4 % — SIGNIFICANT CHANGE UP (ref 0–2)
BILIRUB SERPL-MCNC: 0.5 MG/DL — SIGNIFICANT CHANGE UP (ref 0.2–1.2)
BUN SERPL-MCNC: 10 MG/DL — SIGNIFICANT CHANGE UP (ref 7–23)
CA-I SERPL-SCNC: 1.2 MMOL/L — SIGNIFICANT CHANGE UP (ref 1.15–1.33)
CALCIUM SERPL-MCNC: 9.2 MG/DL — SIGNIFICANT CHANGE UP (ref 8.4–10.5)
CHLORIDE BLDV-SCNC: 102 MMOL/L — SIGNIFICANT CHANGE UP (ref 96–108)
CHLORIDE SERPL-SCNC: 102 MMOL/L — SIGNIFICANT CHANGE UP (ref 96–108)
CO2 BLDV-SCNC: 31 MMOL/L — HIGH (ref 22–26)
CO2 SERPL-SCNC: 23 MMOL/L — SIGNIFICANT CHANGE UP (ref 22–31)
CREAT SERPL-MCNC: 0.79 MG/DL — SIGNIFICANT CHANGE UP (ref 0.5–1.3)
EGFR: 91 ML/MIN/1.73M2 — SIGNIFICANT CHANGE UP
EOSINOPHIL # BLD AUTO: 0.06 K/UL — SIGNIFICANT CHANGE UP (ref 0–0.5)
EOSINOPHIL NFR BLD AUTO: 1.3 % — SIGNIFICANT CHANGE UP (ref 0–6)
GAS PNL BLDV: 134 MMOL/L — LOW (ref 136–145)
GAS PNL BLDV: SIGNIFICANT CHANGE UP
GLUCOSE BLDV-MCNC: 91 MG/DL — SIGNIFICANT CHANGE UP (ref 70–99)
GLUCOSE SERPL-MCNC: 93 MG/DL — SIGNIFICANT CHANGE UP (ref 70–99)
HCO3 BLDV-SCNC: 29 MMOL/L — SIGNIFICANT CHANGE UP (ref 22–29)
HCT VFR BLD CALC: 38.5 % — SIGNIFICANT CHANGE UP (ref 34.5–45)
HCT VFR BLDA CALC: 38 % — SIGNIFICANT CHANGE UP (ref 34.5–46.5)
HGB BLD CALC-MCNC: 12.8 G/DL — SIGNIFICANT CHANGE UP (ref 11.7–16.1)
HGB BLD-MCNC: 12.1 G/DL — SIGNIFICANT CHANGE UP (ref 11.5–15.5)
LACTATE BLDV-MCNC: 0.7 MMOL/L — SIGNIFICANT CHANGE UP (ref 0.5–2)
LYMPHOCYTES # BLD AUTO: 2.5 K/UL — SIGNIFICANT CHANGE UP (ref 1–3.3)
LYMPHOCYTES # BLD AUTO: 53.2 % — HIGH (ref 13–44)
MCHC RBC-ENTMCNC: 26.7 PG — LOW (ref 27–34)
MCHC RBC-ENTMCNC: 31.4 GM/DL — LOW (ref 32–36)
MCV RBC AUTO: 85 FL — SIGNIFICANT CHANGE UP (ref 80–100)
MONOCYTES # BLD AUTO: 0.43 K/UL — SIGNIFICANT CHANGE UP (ref 0–0.9)
MONOCYTES NFR BLD AUTO: 9.1 % — SIGNIFICANT CHANGE UP (ref 2–14)
NEUTROPHILS # BLD AUTO: 1.69 K/UL — LOW (ref 1.8–7.4)
NEUTROPHILS NFR BLD AUTO: 36 % — LOW (ref 43–77)
NRBC # BLD: 0 /100 WBCS — SIGNIFICANT CHANGE UP (ref 0–0)
NT-PROBNP SERPL-SCNC: <36 PG/ML — SIGNIFICANT CHANGE UP (ref 0–300)
PCO2 BLDV: 54 MMHG — HIGH (ref 39–42)
PH BLDV: 7.34 — SIGNIFICANT CHANGE UP (ref 7.32–7.43)
PLATELET # BLD AUTO: 323 K/UL — SIGNIFICANT CHANGE UP (ref 150–400)
PO2 BLDV: 28 MMHG — SIGNIFICANT CHANGE UP (ref 25–45)
POTASSIUM BLDV-SCNC: 3.8 MMOL/L — SIGNIFICANT CHANGE UP (ref 3.5–5.1)
POTASSIUM SERPL-MCNC: 3.9 MMOL/L — SIGNIFICANT CHANGE UP (ref 3.5–5.3)
POTASSIUM SERPL-SCNC: 3.9 MMOL/L — SIGNIFICANT CHANGE UP (ref 3.5–5.3)
PROT SERPL-MCNC: 7.3 G/DL — SIGNIFICANT CHANGE UP (ref 6–8.3)
RBC # BLD: 4.53 M/UL — SIGNIFICANT CHANGE UP (ref 3.8–5.2)
RBC # FLD: 13.8 % — SIGNIFICANT CHANGE UP (ref 10.3–14.5)
SAO2 % BLDV: 47.8 % — LOW (ref 67–88)
SODIUM SERPL-SCNC: 139 MMOL/L — SIGNIFICANT CHANGE UP (ref 135–145)
TROPONIN T, HIGH SENSITIVITY RESULT: <6 NG/L — SIGNIFICANT CHANGE UP (ref 0–51)
TROPONIN T, HIGH SENSITIVITY RESULT: <6 NG/L — SIGNIFICANT CHANGE UP (ref 0–51)
WBC # BLD: 4.7 K/UL — SIGNIFICANT CHANGE UP (ref 3.8–10.5)
WBC # FLD AUTO: 4.7 K/UL — SIGNIFICANT CHANGE UP (ref 3.8–10.5)

## 2024-07-17 PROCEDURE — 93005 ELECTROCARDIOGRAM TRACING: CPT | Mod: 76

## 2024-07-17 PROCEDURE — 84132 ASSAY OF SERUM POTASSIUM: CPT

## 2024-07-17 PROCEDURE — 82330 ASSAY OF CALCIUM: CPT

## 2024-07-17 PROCEDURE — 85014 HEMATOCRIT: CPT

## 2024-07-17 PROCEDURE — 83605 ASSAY OF LACTIC ACID: CPT

## 2024-07-17 PROCEDURE — 83880 ASSAY OF NATRIURETIC PEPTIDE: CPT

## 2024-07-17 PROCEDURE — 82435 ASSAY OF BLOOD CHLORIDE: CPT

## 2024-07-17 PROCEDURE — 82803 BLOOD GASES ANY COMBINATION: CPT

## 2024-07-17 PROCEDURE — 82947 ASSAY GLUCOSE BLOOD QUANT: CPT

## 2024-07-17 PROCEDURE — 84295 ASSAY OF SERUM SODIUM: CPT

## 2024-07-17 PROCEDURE — 85018 HEMOGLOBIN: CPT

## 2024-07-17 PROCEDURE — 80053 COMPREHEN METABOLIC PANEL: CPT

## 2024-07-17 PROCEDURE — 84484 ASSAY OF TROPONIN QUANT: CPT

## 2024-07-17 PROCEDURE — 71045 X-RAY EXAM CHEST 1 VIEW: CPT | Mod: 26

## 2024-07-17 PROCEDURE — 85025 COMPLETE CBC W/AUTO DIFF WBC: CPT

## 2024-07-17 PROCEDURE — 71045 X-RAY EXAM CHEST 1 VIEW: CPT

## 2024-07-17 PROCEDURE — 99285 EMERGENCY DEPT VISIT HI MDM: CPT | Mod: 25

## 2024-07-25 ENCOUNTER — APPOINTMENT (OUTPATIENT)
Dept: CARDIOLOGY | Facility: CLINIC | Age: 49
End: 2024-07-25
Payer: COMMERCIAL

## 2024-07-25 ENCOUNTER — NON-APPOINTMENT (OUTPATIENT)
Age: 49
End: 2024-07-25

## 2024-07-25 VITALS
HEART RATE: 71 BPM | SYSTOLIC BLOOD PRESSURE: 130 MMHG | OXYGEN SATURATION: 97 % | DIASTOLIC BLOOD PRESSURE: 82 MMHG | BODY MASS INDEX: 29.49 KG/M2 | WEIGHT: 146 LBS

## 2024-07-25 DIAGNOSIS — R07.89 OTHER CHEST PAIN: ICD-10-CM

## 2024-07-25 DIAGNOSIS — E78.5 HYPERLIPIDEMIA, UNSPECIFIED: ICD-10-CM

## 2024-07-25 DIAGNOSIS — Z86.59 PERSONAL HISTORY OF OTHER MENTAL AND BEHAVIORAL DISORDERS: ICD-10-CM

## 2024-07-25 DIAGNOSIS — I10 ESSENTIAL (PRIMARY) HYPERTENSION: ICD-10-CM

## 2024-07-25 PROCEDURE — 93000 ELECTROCARDIOGRAM COMPLETE: CPT

## 2024-07-25 PROCEDURE — 99214 OFFICE O/P EST MOD 30 MIN: CPT | Mod: 25

## 2024-07-25 NOTE — PHYSICAL EXAM
[Well Developed] : well developed [Well Nourished] : well nourished [No Acute Distress] : no acute distress [Normal Conjunctiva] : normal conjunctiva [No Carotid Bruit] : no carotid bruit [Normal S1, S2] : normal S1, S2 [No Murmur] : no murmur [No Rub] : no rub [No Gallop] : no gallop [Clear Lung Fields] : clear lung fields [Good Air Entry] : good air entry [No Respiratory Distress] : no respiratory distress  [Normal Gait] : normal gait [No Edema] : no edema [Normal Radial B/L] : normal radial B/L [Normal PT B/L] : normal PT B/L [Moves all extremities] : moves all extremities [No Focal Deficits] : no focal deficits [Normal Speech] : normal speech [Alert and Oriented] : alert and oriented [Normal memory] : normal memory

## 2024-07-25 NOTE — ASSESSMENT
[FreeTextEntry1] :  Suspect non-cardiac chest pain given the constant, non-exertional chest pain she experienced for 24 hours with negative biomarkers thereafter. Reassuring that pain has not recurred since. Counseled on diet/exercise and risk factor management. Will follow-up in 6 months.

## 2024-07-25 NOTE — HISTORY OF PRESENT ILLNESS
[FreeTextEntry1] : Ms. SHAVER is a 49 year female presenting for evaluation of chest pain  Notable PMHx: - HTN - HLD - Prediabetes - Overweight  - Never smoker - Family history of premature CAD in father, brother (30s), uncles   HPI:  Seen in [facility] ED on 24 for acute chest pain that was ongoing for 24 hours. She was in her usual state of health prior to this. She had sudden onset of chest pain while [place] 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 The pain did not change at all during this time. She felt better when she would get up and walk around and laying back. Sitting made it worse. Described has left sided, pressure like, radiating towards left shoulder. Had some associated palpitations with this.  Had an episode of dyspnea 10 years ago. Had HUGO here 2 years ago and resting TTE that were normal.  ED workup included negative troponin x 2 and otherwise unremarkable labs. EKG showed NSR and read as cannot rule out anterior infarct but suspect inaccurate precordial lead placement. While in the ER the pain spontaneously resolved after 3-4 hours in the ER. Pain has not recurred since then.   Has 1 daughter and underwent  due to failure to progress. No eclampsia or pre-eclampsia.  Denies palpitations, light-headedness/dizziness, syncope, orthopnea, PND, ankle swelling, cough, fever/chills  Functional Status: - ADLs: independent - Exercise: no formal exercise, though heavy lifting and [place]

## 2024-10-01 NOTE — PRE-ANESTHESIA EVALUATION ADULT - NSANTHBPHIGHRD_ENT_A_CORE
Yes [Clinical Interview] : Clinical Interview [Collateral Sources] : Collateral Sources [In last 30 days] : in the last 30 days [(3) 2-5 times per week] : Frequency: How many times have you had these thoughts? 2 to 5 times per week [(2) Less than 1 hour/some of the time] : Less than 1 hour/some of the time [(2) Can control thoughts with little difficulty] : Can control thoughts with little difficulty [(1) Deterrents definitely stopped you from attempting suicide] : Deterrents definitely stopped you from attempting suicide [(5) Completely to end or stop the pain (you could't go on living with the pain or how you were feeling)] : Completely to end or stop the pain (you couldn't go on living with the pain or how you were feeling) [Mood disorder] : mood disorder [ADHD] : ADHD [Depressed mood/Anhedonia] : depressed mood/anhedonia [Non-compliant or not receiving treatment] : non-compliant or not receiving treatment [Triggering events leading to humiliation, shame, and/or despair] : triggering events leading to humiliation, shame, and/or despair (e.g. loss of relationship, financial or health status) (real or anticipated) [Inadequate social supports] : inadequate social supports [Identifies reasons for living] : identifies reasons for living [Supportive social network of family or friends] : supportive social network of family or friends [Ability to cope with stress] : ability to cope with stress [Responsibility to children, family, or others] : responsibility to children, family, or others [Frustration tolerance] : frustration tolerance [Engaged in work or school] : engaged in work or school [None in the patient's lifetime] : None in the patient's lifetime [None Known] : none known [No] : no [No known risk factors] : No known risk factors [Residential stability] : residential stability [Relationship stability] : relationship stability [Affective stability] : affective stability [Sobriety] : sobriety [Yes] : yes [FreeTextEntry2] : 13 [FreeTextEntry6] : see scanned safety plan.  [de-identified] : no access to either reported.

## 2024-11-09 NOTE — H&P PST ADULT - HEMATOLOGY/LYMPHATICS
The patient's goals for the shift include rest/comfort    The clinical goals for the shift include monitor v/s, labs    Over the shift, the patient did not make progress toward the following goals. Barriers to progression include increased heart rate. Recommendations to address these barriers include promote oral hydration as ordered.      Problem: Fall/Injury  Goal: Be free from injury by end of the shift  Outcome: Progressing     Problem: Nutrition  Goal: Lab values WNL  Outcome: Progressing  Goal: Electrolytes WNL  Outcome: Progressing     Problem: Pain  Goal: Takes deep breaths with improved pain control throughout the shift  Outcome: Progressing     Problem: Skin  Goal: Prevent/manage excess moisture  Flowsheets (Taken 11/9/2024 0153)  Prevent/manage excess moisture: Cleanse incontinence/protect with barrier cream     Problem: Discharge Planning  Goal: Discharge to home or other facility with appropriate resources  Flowsheets (Taken 11/9/2024 0153)  Discharge to home or other facility with appropriate resources: Identify barriers to discharge with patient and caregiver     Problem: Chronic Conditions and Co-morbidities  Goal: Patient's chronic conditions and co-morbidity symptoms are monitored and maintained or improved  Flowsheets (Taken 11/9/2024 0153)  Care Plan - Patient's Chronic Conditions and Co-Morbidity Symptoms are Monitored and Maintained or Improved: Monitor and assess patient's chronic conditions and comorbid symptoms for stability, deterioration, or improvement      negative

## 2024-11-12 ENCOUNTER — APPOINTMENT (OUTPATIENT)
Dept: INTERNAL MEDICINE | Facility: CLINIC | Age: 49
End: 2024-11-12

## 2024-11-15 ENCOUNTER — OUTPATIENT (OUTPATIENT)
Dept: OUTPATIENT SERVICES | Facility: HOSPITAL | Age: 49
LOS: 1 days | End: 2024-11-15

## 2024-11-15 ENCOUNTER — APPOINTMENT (OUTPATIENT)
Dept: INTERNAL MEDICINE | Facility: CLINIC | Age: 49
End: 2024-11-15

## 2024-11-15 VITALS
HEART RATE: 71 BPM | BODY MASS INDEX: 29.23 KG/M2 | SYSTOLIC BLOOD PRESSURE: 112 MMHG | WEIGHT: 145 LBS | DIASTOLIC BLOOD PRESSURE: 80 MMHG | OXYGEN SATURATION: 98 % | HEIGHT: 59 IN

## 2024-11-15 DIAGNOSIS — Z90.710 ACQUIRED ABSENCE OF BOTH CERVIX AND UTERUS: Chronic | ICD-10-CM

## 2024-11-15 DIAGNOSIS — M79.601 PAIN IN RIGHT ARM: ICD-10-CM

## 2024-11-15 DIAGNOSIS — Z98.891 HISTORY OF UTERINE SCAR FROM PREVIOUS SURGERY: Chronic | ICD-10-CM

## 2024-11-15 DIAGNOSIS — S91.203A UNSPECIFIED OPEN WOUND OF UNSPECIFIED GREAT TOE WITH DAMAGE TO NAIL, INITIAL ENCOUNTER: Chronic | ICD-10-CM

## 2024-11-15 DIAGNOSIS — N64.4 MASTODYNIA: ICD-10-CM

## 2024-11-15 PROCEDURE — 99214 OFFICE O/P EST MOD 30 MIN: CPT

## 2024-11-15 PROCEDURE — G0463: CPT

## 2024-11-15 PROCEDURE — G2211 COMPLEX E/M VISIT ADD ON: CPT

## 2024-11-18 ENCOUNTER — APPOINTMENT (OUTPATIENT)
Dept: ULTRASOUND IMAGING | Facility: CLINIC | Age: 49
End: 2024-11-18
Payer: COMMERCIAL

## 2024-11-18 ENCOUNTER — RESULT REVIEW (OUTPATIENT)
Age: 49
End: 2024-11-18

## 2024-11-18 ENCOUNTER — APPOINTMENT (OUTPATIENT)
Dept: MAMMOGRAPHY | Facility: CLINIC | Age: 49
End: 2024-11-18
Payer: COMMERCIAL

## 2024-11-18 PROCEDURE — 77063 BREAST TOMOSYNTHESIS BI: CPT

## 2024-11-18 PROCEDURE — 76641 ULTRASOUND BREAST COMPLETE: CPT | Mod: 50

## 2024-11-18 PROCEDURE — 77067 SCR MAMMO BI INCL CAD: CPT

## 2024-11-21 ENCOUNTER — TRANSCRIPTION ENCOUNTER (OUTPATIENT)
Age: 49
End: 2024-11-21

## 2024-12-23 NOTE — ED PROVIDER NOTE - CHIEF COMPLAINT
The patient is a 42y Female complaining of Detail Level: Detailed Quality 226: Preventive Care And Screening: Tobacco Use: Screening And Cessation Intervention: Patient screened for tobacco use and is an ex/non-smoker The patient is a 42y Female complaining of RLQ abd pain

## 2025-01-16 DIAGNOSIS — I10 ESSENTIAL (PRIMARY) HYPERTENSION: ICD-10-CM

## 2025-01-17 ENCOUNTER — OUTPATIENT (OUTPATIENT)
Dept: OUTPATIENT SERVICES | Facility: HOSPITAL | Age: 50
LOS: 1 days | End: 2025-01-17

## 2025-01-17 DIAGNOSIS — Z90.710 ACQUIRED ABSENCE OF BOTH CERVIX AND UTERUS: Chronic | ICD-10-CM

## 2025-01-17 DIAGNOSIS — Z98.891 HISTORY OF UTERINE SCAR FROM PREVIOUS SURGERY: Chronic | ICD-10-CM

## 2025-01-21 ENCOUNTER — APPOINTMENT (OUTPATIENT)
Dept: INTERNAL MEDICINE | Facility: CLINIC | Age: 50
End: 2025-01-21
Payer: COMMERCIAL

## 2025-01-21 VITALS
DIASTOLIC BLOOD PRESSURE: 90 MMHG | HEART RATE: 69 BPM | WEIGHT: 145 LBS | SYSTOLIC BLOOD PRESSURE: 128 MMHG | BODY MASS INDEX: 29.23 KG/M2 | OXYGEN SATURATION: 97 % | HEIGHT: 59 IN

## 2025-01-21 DIAGNOSIS — M54.16 RADICULOPATHY, LUMBAR REGION: ICD-10-CM

## 2025-01-21 DIAGNOSIS — N64.4 MASTODYNIA: ICD-10-CM

## 2025-01-21 DIAGNOSIS — M24.159 OTHER ARTICULAR CARTILAGE DISORDERS, UNSPECIFIED HIP: ICD-10-CM

## 2025-01-21 DIAGNOSIS — Z91.89 OTHER SPECIFIED PERSONAL RISK FACTORS, NOT ELSEWHERE CLASSIFIED: ICD-10-CM

## 2025-01-21 DIAGNOSIS — M25.851 OTHER SPECIFIED JOINT DISORDERS, RIGHT HIP: ICD-10-CM

## 2025-01-21 PROCEDURE — 99214 OFFICE O/P EST MOD 30 MIN: CPT

## 2025-01-21 PROCEDURE — G2211 COMPLEX E/M VISIT ADD ON: CPT

## 2025-01-21 RX ORDER — MELOXICAM 15 MG/1
15 TABLET ORAL
Qty: 14 | Refills: 0 | Status: ACTIVE | COMMUNITY
Start: 2025-01-01

## 2025-01-30 ENCOUNTER — APPOINTMENT (OUTPATIENT)
Dept: ORTHOPEDIC SURGERY | Facility: CLINIC | Age: 50
End: 2025-01-30

## 2025-02-10 ENCOUNTER — APPOINTMENT (OUTPATIENT)
Dept: ORTHOPEDIC SURGERY | Facility: CLINIC | Age: 50
End: 2025-02-10

## 2025-05-22 ENCOUNTER — OUTPATIENT (OUTPATIENT)
Dept: OUTPATIENT SERVICES | Facility: HOSPITAL | Age: 50
LOS: 1 days | End: 2025-05-22

## 2025-05-22 ENCOUNTER — APPOINTMENT (OUTPATIENT)
Dept: INTERNAL MEDICINE | Facility: CLINIC | Age: 50
End: 2025-05-22
Payer: COMMERCIAL

## 2025-05-22 VITALS
OXYGEN SATURATION: 95 % | DIASTOLIC BLOOD PRESSURE: 78 MMHG | BODY MASS INDEX: 31.25 KG/M2 | HEART RATE: 50 BPM | SYSTOLIC BLOOD PRESSURE: 128 MMHG | WEIGHT: 155 LBS | HEIGHT: 59 IN

## 2025-05-22 DIAGNOSIS — N64.89 OTHER SPECIFIED DISORDERS OF BREAST: ICD-10-CM

## 2025-05-22 DIAGNOSIS — Z91.89 OTHER SPECIFIED PERSONAL RISK FACTORS, NOT ELSEWHERE CLASSIFIED: ICD-10-CM

## 2025-05-22 DIAGNOSIS — N64.4 MASTODYNIA: ICD-10-CM

## 2025-05-22 DIAGNOSIS — Z90.710 ACQUIRED ABSENCE OF BOTH CERVIX AND UTERUS: Chronic | ICD-10-CM

## 2025-05-22 DIAGNOSIS — I10 ESSENTIAL (PRIMARY) HYPERTENSION: ICD-10-CM

## 2025-05-22 DIAGNOSIS — N60.99 UNSPECIFIED BENIGN MAMMARY DYSPLASIA OF UNSPECIFIED BREAST: ICD-10-CM

## 2025-05-22 DIAGNOSIS — Z98.891 HISTORY OF UTERINE SCAR FROM PREVIOUS SURGERY: Chronic | ICD-10-CM

## 2025-05-22 DIAGNOSIS — S91.203A UNSPECIFIED OPEN WOUND OF UNSPECIFIED GREAT TOE WITH DAMAGE TO NAIL, INITIAL ENCOUNTER: Chronic | ICD-10-CM

## 2025-05-22 PROCEDURE — G0463: CPT

## 2025-05-22 PROCEDURE — G2211 COMPLEX E/M VISIT ADD ON: CPT | Mod: NC

## 2025-05-22 PROCEDURE — 99213 OFFICE O/P EST LOW 20 MIN: CPT

## 2025-06-09 ENCOUNTER — TRANSCRIPTION ENCOUNTER (OUTPATIENT)
Age: 50
End: 2025-06-09

## 2025-06-09 DIAGNOSIS — I10 ESSENTIAL (PRIMARY) HYPERTENSION: ICD-10-CM

## 2025-06-09 PROBLEM — S69.90XA: Status: ACTIVE | Noted: 2025-06-09

## 2025-06-11 ENCOUNTER — TRANSCRIPTION ENCOUNTER (OUTPATIENT)
Age: 50
End: 2025-06-11

## 2025-06-11 ENCOUNTER — RESULT REVIEW (OUTPATIENT)
Age: 50
End: 2025-06-11

## 2025-06-11 ENCOUNTER — APPOINTMENT (OUTPATIENT)
Dept: MAMMOGRAPHY | Facility: IMAGING CENTER | Age: 50
End: 2025-06-11
Payer: COMMERCIAL

## 2025-06-11 ENCOUNTER — OUTPATIENT (OUTPATIENT)
Dept: OUTPATIENT SERVICES | Facility: HOSPITAL | Age: 50
LOS: 1 days | End: 2025-06-11
Payer: COMMERCIAL

## 2025-06-11 ENCOUNTER — APPOINTMENT (OUTPATIENT)
Dept: MRI IMAGING | Facility: IMAGING CENTER | Age: 50
End: 2025-06-11
Payer: COMMERCIAL

## 2025-06-11 ENCOUNTER — APPOINTMENT (OUTPATIENT)
Dept: ULTRASOUND IMAGING | Facility: IMAGING CENTER | Age: 50
End: 2025-06-11
Payer: COMMERCIAL

## 2025-06-11 DIAGNOSIS — Z98.891 HISTORY OF UTERINE SCAR FROM PREVIOUS SURGERY: Chronic | ICD-10-CM

## 2025-06-11 DIAGNOSIS — N64.4 MASTODYNIA: ICD-10-CM

## 2025-06-11 DIAGNOSIS — R92.30 DENSE BREASTS, UNSPECIFIED: ICD-10-CM

## 2025-06-11 DIAGNOSIS — S91.203A UNSPECIFIED OPEN WOUND OF UNSPECIFIED GREAT TOE WITH DAMAGE TO NAIL, INITIAL ENCOUNTER: Chronic | ICD-10-CM

## 2025-06-11 DIAGNOSIS — Z91.89 OTHER SPECIFIED PERSONAL RISK FACTORS, NOT ELSEWHERE CLASSIFIED: ICD-10-CM

## 2025-06-11 DIAGNOSIS — Z90.710 ACQUIRED ABSENCE OF BOTH CERVIX AND UTERUS: Chronic | ICD-10-CM

## 2025-06-11 PROCEDURE — 77049 MRI BREAST C-+ W/CAD BI: CPT | Mod: 26

## 2025-06-11 PROCEDURE — 77065 DX MAMMO INCL CAD UNI: CPT | Mod: 26,RT

## 2025-06-11 PROCEDURE — 76642 ULTRASOUND BREAST LIMITED: CPT

## 2025-06-11 PROCEDURE — A9585: CPT

## 2025-06-11 PROCEDURE — 77061 BREAST TOMOSYNTHESIS UNI: CPT | Mod: 26,RT

## 2025-06-11 PROCEDURE — 76642 ULTRASOUND BREAST LIMITED: CPT | Mod: 26,RT

## 2025-06-11 PROCEDURE — G0279: CPT

## 2025-06-11 PROCEDURE — C8908: CPT

## 2025-06-11 PROCEDURE — 77065 DX MAMMO INCL CAD UNI: CPT

## 2025-06-11 PROCEDURE — C8937: CPT

## 2025-06-17 ENCOUNTER — NON-APPOINTMENT (OUTPATIENT)
Age: 50
End: 2025-06-17

## 2025-06-17 PROBLEM — R92.8 ABNORMAL MAMMOGRAM: Status: ACTIVE | Noted: 2025-06-17

## 2025-06-20 ENCOUNTER — APPOINTMENT (OUTPATIENT)
Dept: ORTHOPEDIC SURGERY | Facility: CLINIC | Age: 50
End: 2025-06-20
Payer: OTHER MISCELLANEOUS

## 2025-06-20 ENCOUNTER — NON-APPOINTMENT (OUTPATIENT)
Age: 50
End: 2025-06-20

## 2025-06-20 VITALS — BODY MASS INDEX: 29.84 KG/M2 | HEIGHT: 59 IN | WEIGHT: 148 LBS

## 2025-06-20 PROBLEM — S63.8X2A LEFT SCAPHOLUNATE LIGAMENT TEAR: Status: ACTIVE | Noted: 2025-06-20

## 2025-06-20 PROCEDURE — 73110 X-RAY EXAM OF WRIST: CPT | Mod: LT

## 2025-06-20 PROCEDURE — 99204 OFFICE O/P NEW MOD 45 MIN: CPT

## 2025-06-27 ENCOUNTER — APPOINTMENT (OUTPATIENT)
Dept: INTERNAL MEDICINE | Facility: CLINIC | Age: 50
End: 2025-06-27

## 2025-07-07 ENCOUNTER — RESULT REVIEW (OUTPATIENT)
Age: 50
End: 2025-07-07

## 2025-07-07 ENCOUNTER — APPOINTMENT (OUTPATIENT)
Dept: MRI IMAGING | Facility: IMAGING CENTER | Age: 50
End: 2025-07-07
Payer: COMMERCIAL

## 2025-07-07 ENCOUNTER — OUTPATIENT (OUTPATIENT)
Dept: OUTPATIENT SERVICES | Facility: HOSPITAL | Age: 50
LOS: 1 days | End: 2025-07-07
Payer: COMMERCIAL

## 2025-07-07 DIAGNOSIS — N64.4 MASTODYNIA: ICD-10-CM

## 2025-07-07 DIAGNOSIS — R92.8 OTHER ABNORMAL AND INCONCLUSIVE FINDINGS ON DIAGNOSTIC IMAGING OF BREAST: ICD-10-CM

## 2025-07-07 DIAGNOSIS — Z98.891 HISTORY OF UTERINE SCAR FROM PREVIOUS SURGERY: Chronic | ICD-10-CM

## 2025-07-07 DIAGNOSIS — S91.203A UNSPECIFIED OPEN WOUND OF UNSPECIFIED GREAT TOE WITH DAMAGE TO NAIL, INITIAL ENCOUNTER: Chronic | ICD-10-CM

## 2025-07-07 DIAGNOSIS — Z90.710 ACQUIRED ABSENCE OF BOTH CERVIX AND UTERUS: Chronic | ICD-10-CM

## 2025-07-07 PROCEDURE — 19085 BX BREAST 1ST LESION MR IMAG: CPT | Mod: LT

## 2025-07-07 PROCEDURE — A9585: CPT

## 2025-07-07 PROCEDURE — 77065 DX MAMMO INCL CAD UNI: CPT

## 2025-07-07 PROCEDURE — 88305 TISSUE EXAM BY PATHOLOGIST: CPT | Mod: 26

## 2025-07-07 PROCEDURE — 19085 BX BREAST 1ST LESION MR IMAG: CPT

## 2025-07-07 PROCEDURE — 88305 TISSUE EXAM BY PATHOLOGIST: CPT

## 2025-07-07 PROCEDURE — 77065 DX MAMMO INCL CAD UNI: CPT | Mod: 26,LT

## 2025-07-07 PROCEDURE — A4648: CPT

## 2025-07-09 ENCOUNTER — NON-APPOINTMENT (OUTPATIENT)
Age: 50
End: 2025-07-09

## 2025-07-23 ENCOUNTER — OUTPATIENT (OUTPATIENT)
Dept: OUTPATIENT SERVICES | Facility: HOSPITAL | Age: 50
LOS: 1 days | End: 2025-07-23

## 2025-07-23 ENCOUNTER — OUTPATIENT (OUTPATIENT)
Dept: OUTPATIENT SERVICES | Facility: HOSPITAL | Age: 50
LOS: 1 days | End: 2025-07-23
Payer: COMMERCIAL

## 2025-07-23 ENCOUNTER — APPOINTMENT (OUTPATIENT)
Dept: INTERNAL MEDICINE | Facility: CLINIC | Age: 50
End: 2025-07-23
Payer: COMMERCIAL

## 2025-07-23 VITALS
OXYGEN SATURATION: 98 % | HEIGHT: 59 IN | HEART RATE: 65 BPM | SYSTOLIC BLOOD PRESSURE: 130 MMHG | BODY MASS INDEX: 31.85 KG/M2 | DIASTOLIC BLOOD PRESSURE: 78 MMHG | TEMPERATURE: 98.2 F | WEIGHT: 158 LBS

## 2025-07-23 DIAGNOSIS — Z98.891 HISTORY OF UTERINE SCAR FROM PREVIOUS SURGERY: Chronic | ICD-10-CM

## 2025-07-23 DIAGNOSIS — Z90.710 ACQUIRED ABSENCE OF BOTH CERVIX AND UTERUS: Chronic | ICD-10-CM

## 2025-07-23 DIAGNOSIS — S91.203A UNSPECIFIED OPEN WOUND OF UNSPECIFIED GREAT TOE WITH DAMAGE TO NAIL, INITIAL ENCOUNTER: Chronic | ICD-10-CM

## 2025-07-23 DIAGNOSIS — I10 ESSENTIAL (PRIMARY) HYPERTENSION: ICD-10-CM

## 2025-07-23 DIAGNOSIS — J02.9 ACUTE PHARYNGITIS, UNSPECIFIED: ICD-10-CM

## 2025-07-23 PROCEDURE — G0463: CPT

## 2025-07-23 PROCEDURE — G2211 COMPLEX E/M VISIT ADD ON: CPT | Mod: NC

## 2025-07-23 PROCEDURE — 99213 OFFICE O/P EST LOW 20 MIN: CPT

## 2025-07-24 ENCOUNTER — TRANSCRIPTION ENCOUNTER (OUTPATIENT)
Age: 50
End: 2025-07-24

## 2025-07-24 LAB
INFLUENZA A RESULT: NOT DETECTED
INFLUENZA B RESULT: NOT DETECTED
RESP SYN VIRUS RESULT: NOT DETECTED
SARS-COV-2 RESULT: NOT DETECTED

## 2025-07-28 ENCOUNTER — NON-APPOINTMENT (OUTPATIENT)
Age: 50
End: 2025-07-28

## 2025-08-05 DIAGNOSIS — J02.9 ACUTE PHARYNGITIS, UNSPECIFIED: ICD-10-CM

## 2025-08-06 ENCOUNTER — APPOINTMENT (OUTPATIENT)
Dept: ORTHOPEDIC SURGERY | Facility: CLINIC | Age: 50
End: 2025-08-06

## 2025-08-06 ENCOUNTER — NON-APPOINTMENT (OUTPATIENT)
Age: 50
End: 2025-08-06

## 2025-09-09 ENCOUNTER — APPOINTMENT (OUTPATIENT)
Dept: ORTHOPEDIC SURGERY | Facility: CLINIC | Age: 50
End: 2025-09-09

## (undated) DEVICE — IRRIGATOR BIO SHIELD

## (undated) DEVICE — SYR LUER LOK 50CC

## (undated) DEVICE — FORCEP RADIAL JAW 4 JUMBO 2.8MM 3.2MM 240CM ORANGE DISP

## (undated) DEVICE — BIOPSY FORCEP RADIAL JAW 4 STANDARD WITH NEEDLE

## (undated) DEVICE — TUBING SUCTION CONN 6FT STERILE

## (undated) DEVICE — ELCTR GROUNDING PAD ADULT COVIDIEN

## (undated) DEVICE — CLAMP BX HOT RAD JAW 3

## (undated) DEVICE — SOL INJ NS 0.9% 500ML 2 PORT

## (undated) DEVICE — PACK IV START WITH CHG

## (undated) DEVICE — TUBING SUCTION 20FT

## (undated) DEVICE — SUCTION YANKAUER NO CONTROL VENT

## (undated) DEVICE — SENSOR O2 FINGER ADULT

## (undated) DEVICE — TUBING IV SET GRAVITY 3Y 100" MACRO

## (undated) DEVICE — POLY TRAP ETRAP

## (undated) DEVICE — CATH IV SAFE BC 20G X 1.16" (PINK)

## (undated) DEVICE — BRUSH COLONOSCOPY CYTOLOGY

## (undated) DEVICE — CATH IV SAFE BC 22G X 1" (BLUE)

## (undated) DEVICE — FOLEY HOLDER STATLOCK 2 WAY ADULT

## (undated) DEVICE — SNARE OVAL LOOP MICOR